# Patient Record
Sex: FEMALE | Race: WHITE | Employment: FULL TIME | URBAN - METROPOLITAN AREA
[De-identification: names, ages, dates, MRNs, and addresses within clinical notes are randomized per-mention and may not be internally consistent; named-entity substitution may affect disease eponyms.]

---

## 2017-01-01 ENCOUNTER — HOSPITAL ENCOUNTER (OUTPATIENT)
Age: 27
Discharge: HOME | End: 2017-01-01

## 2017-01-01 ENCOUNTER — APPOINTMENT (OUTPATIENT)
Dept: GENERAL RADIOLOGY | Age: 27
End: 2017-01-01
Attending: PHYSICIAN ASSISTANT

## 2017-01-01 VITALS
DIASTOLIC BLOOD PRESSURE: 66 MMHG | SYSTOLIC BLOOD PRESSURE: 109 MMHG | HEART RATE: 94 BPM | RESPIRATION RATE: 18 BRPM | WEIGHT: 125 LBS | OXYGEN SATURATION: 98 % | TEMPERATURE: 100 F

## 2017-01-01 DIAGNOSIS — J40 BRONCHITIS: ICD-10-CM

## 2017-01-01 DIAGNOSIS — R19.7 NAUSEA VOMITING AND DIARRHEA: Primary | ICD-10-CM

## 2017-01-01 DIAGNOSIS — R11.2 NAUSEA VOMITING AND DIARRHEA: Primary | ICD-10-CM

## 2017-01-01 LAB
POCT BILIRUBIN URINE: NEGATIVE
POCT BLOOD URINE: NEGATIVE
POCT GLUCOSE URINE: NEGATIVE MG/DL
POCT LEUKOCYTE ESTERASE URINE: NEGATIVE
POCT NITRITE URINE: NEGATIVE
POCT PH URINE: 7 (ref 5–8)
POCT PROTEIN URINE: 30 MG/DL
POCT SPECIFIC GRAVITY URINE: 1.02
POCT URINE CLARITY: CLEAR
POCT URINE COLOR: YELLOW
POCT URINE PREGNANCY: NEGATIVE
POCT UROBILINOGEN URINE: 4 MG/DL

## 2017-01-01 PROCEDURE — 81025 URINE PREGNANCY TEST: CPT | Performed by: PHYSICIAN ASSISTANT

## 2017-01-01 PROCEDURE — 99203 OFFICE O/P NEW LOW 30 MIN: CPT

## 2017-01-01 PROCEDURE — 81002 URINALYSIS NONAUTO W/O SCOPE: CPT | Performed by: PHYSICIAN ASSISTANT

## 2017-01-01 PROCEDURE — 71020 XR CHEST PA + LAT CHEST (CPT=71020): CPT

## 2017-01-01 PROCEDURE — 99204 OFFICE O/P NEW MOD 45 MIN: CPT

## 2017-01-01 RX ORDER — CETIRIZINE HYDROCHLORIDE, PSEUDOEPHEDRINE HYDROCHLORIDE 5; 120 MG/1; MG/1
1 TABLET, FILM COATED, EXTENDED RELEASE ORAL 2 TIMES DAILY
Qty: 14 TABLET | Refills: 0 | Status: SHIPPED | OUTPATIENT
Start: 2017-01-01 | End: 2017-09-14

## 2017-01-01 RX ORDER — ONDANSETRON 4 MG/1
4 TABLET, ORALLY DISINTEGRATING ORAL EVERY 4 HOURS PRN
Qty: 10 TABLET | Refills: 0 | Status: SHIPPED | OUTPATIENT
Start: 2017-01-01 | End: 2017-01-08

## 2017-01-01 RX ORDER — FLUTICASONE PROPIONATE 50 MCG
1 SPRAY, SUSPENSION (ML) NASAL DAILY
Qty: 16 G | Refills: 0 | Status: SHIPPED | OUTPATIENT
Start: 2017-01-01 | End: 2017-09-14

## 2017-01-01 NOTE — ED INITIAL ASSESSMENT (HPI)
Pt. Thinks she possibly ate a bad tamale yesterday about 6pm. Woke up in the night, felt like she had to use the bathroom-cramping. States she woke up about 4am with vomiting & diarrhea. Also, reports cold, sinus, cough on & off for about 1 month.  Last [employer]

## 2017-01-01 NOTE — ED PROVIDER NOTES
Patient Seen in: [facility]    History   Patient presents with:  Nausea/Vomiting/Diarrhea (gastrointestinal)  Fever: 100.6  Cough/URI    Stated Complaint: vomiting, fever    HPI    CHIEF COMPLAINT: Nausea vomiting diarrhea    HI complete review of systems was obtained and other than the HPI was negative     The patient's medication list, past medical history and social history elements is as listed in today's nurse's notes are reviewed and agree.  The patient's family history is re hypertrophy. no trismus or stridor. No phonation changes, patient handling secretions well. Uvula midline   Respiratory: there are no retractions, lungs are with crackles noted to the left lower base. The crackles do clear when the patient coughs.   No w Dictated by: Adamaris Sanders MD on 1/01/2017 at 15:43     Approved by: Adamaris Sanders MD            MDM     I discussed the radiology and laboratory results with the patient.  I discussed the diagnosis and need for followup with their primary care physician

## 2017-06-01 ENCOUNTER — PRIOR ORIGINAL RECORDS (OUTPATIENT)
Dept: OTHER | Age: 27
End: 2017-06-01

## 2017-08-24 ENCOUNTER — PRIOR ORIGINAL RECORDS (OUTPATIENT)
Dept: OTHER | Age: 27
End: 2017-08-24

## 2017-08-31 ENCOUNTER — PRIOR ORIGINAL RECORDS (OUTPATIENT)
Dept: OTHER | Age: 27
End: 2017-08-31

## 2017-09-01 ENCOUNTER — PRIOR ORIGINAL RECORDS (OUTPATIENT)
Dept: OTHER | Age: 27
End: 2017-09-01

## 2017-09-20 ENCOUNTER — OFFICE VISIT (OUTPATIENT)
Dept: OBGYN CLINIC | Facility: CLINIC | Age: 27
End: 2017-09-20

## 2017-09-20 VITALS
HEART RATE: 71 BPM | WEIGHT: 140 LBS | BODY MASS INDEX: 23.9 KG/M2 | DIASTOLIC BLOOD PRESSURE: 60 MMHG | SYSTOLIC BLOOD PRESSURE: 118 MMHG | HEIGHT: 64 IN

## 2017-09-20 DIAGNOSIS — Z30.09 BIRTH CONTROL COUNSELING: ICD-10-CM

## 2017-09-20 DIAGNOSIS — N39.498 OTHER URINARY INCONTINENCE: ICD-10-CM

## 2017-09-20 DIAGNOSIS — Z12.4 CERVICAL CANCER SCREENING: ICD-10-CM

## 2017-09-20 DIAGNOSIS — Z01.419 WELL WOMAN EXAM: Primary | ICD-10-CM

## 2017-09-20 DIAGNOSIS — Z12.39 BREAST CANCER SCREENING: ICD-10-CM

## 2017-09-20 PROCEDURE — 88175 CYTOPATH C/V AUTO FLUID REDO: CPT | Performed by: OBSTETRICS & GYNECOLOGY

## 2017-09-20 PROCEDURE — 99385 PREV VISIT NEW AGE 18-39: CPT | Performed by: OBSTETRICS & GYNECOLOGY

## 2017-09-20 RX ORDER — LEVONORGESTREL AND ETHINYL ESTRADIOL 0.1-0.02MG
1 KIT ORAL DAILY
Qty: 28 TABLET | Refills: 2 | Status: SHIPPED | OUTPATIENT
Start: 2017-09-20 | End: 2017-12-15

## 2017-09-20 NOTE — PROGRESS NOTES
GYN H&P     2017  11:08 AM    CC: Patient presents with:  Physical  Other: some urine loss for past 4 years      HPI: patient is a 33year old  here for her annual gyne exam.   Pt is new to our office.  Menses are regular, medium flow, last 5-7 Partners: Male    Birth control/ protection: Condom     Other Topics Concern    Caffeine Concern Yes    Exercise Yes    Comment: cardio and weights     Social History Narrative   None on file       ROS:     Review of Systems:   Constitutional: Negative for tender, no masses, normal size          Rectal: not indicated, no hemorrhoids  EXTREMITIES:  non tender, without edema      PLAN      1. Well woman exam  -Well woman exam   -discussed diet and exercise    2.  Cervical cancer screening    - THINPREP PAP WITH

## 2017-09-22 RX ORDER — METRONIDAZOLE 7.5 MG/G
1 GEL VAGINAL NIGHTLY
Qty: 70 G | Refills: 0 | Status: SHIPPED | OUTPATIENT
Start: 2017-09-22 | End: 2017-09-27

## 2017-09-22 NOTE — PROGRESS NOTES
Patient informed. Verbalized understanding. She stated she has been having a discharge. Metrogel sent to pharmacy.

## 2017-10-17 ENCOUNTER — TELEPHONE (OUTPATIENT)
Dept: OBGYN CLINIC | Facility: CLINIC | Age: 27
End: 2017-10-17

## 2017-10-17 NOTE — TELEPHONE ENCOUNTER
Returned patient's call. She states that she was treated for BV last month after her pap showed Bv. She did not have any symptoms at the time, but did use metrogel to treat.  Had her period the next week and now is complaining of itching, uncomfortable, bur

## 2017-10-17 NOTE — TELEPHONE ENCOUNTER
Spoke with patient again. Reported to her as noted by Nicole Rivera. She states that she doesn't believe it is BV and denies any possibility of STD. She declines treatment at this time and would prefer to be seen. OV appt made for 10/18 at 1200 with Nicole Rivera in [facility]

## 2017-10-17 NOTE — TELEPHONE ENCOUNTER
Sounds like BV or trich- if any possibility for an STD she should be seen. We can treat BV again and if symptoms persist or reoccur we will need to see her.  I can RX Clindamycin Vaginal if she wants to be treated first.

## 2017-10-17 NOTE — TELEPHONE ENCOUNTER
PT thinks she might have a yeast infection due to the side effects of the medication she was prescribed. Would like a call back from the nurse to see if she can take anything to help the symptoms.

## 2017-10-18 ENCOUNTER — OFFICE VISIT (OUTPATIENT)
Dept: OBGYN CLINIC | Facility: CLINIC | Age: 27
End: 2017-10-18

## 2017-10-18 ENCOUNTER — TELEPHONE (OUTPATIENT)
Dept: OBGYN CLINIC | Facility: CLINIC | Age: 27
End: 2017-10-18

## 2017-10-18 VITALS — BODY MASS INDEX: 24 KG/M2 | SYSTOLIC BLOOD PRESSURE: 110 MMHG | WEIGHT: 142 LBS | DIASTOLIC BLOOD PRESSURE: 68 MMHG

## 2017-10-18 DIAGNOSIS — N76.0 VAGINITIS AND VULVOVAGINITIS: ICD-10-CM

## 2017-10-18 DIAGNOSIS — N89.8 VAGINAL LEUKORRHEA: Primary | ICD-10-CM

## 2017-10-18 PROCEDURE — 99213 OFFICE O/P EST LOW 20 MIN: CPT | Performed by: NURSE PRACTITIONER

## 2017-10-18 PROCEDURE — 87660 TRICHOMONAS VAGIN DIR PROBE: CPT | Performed by: NURSE PRACTITIONER

## 2017-10-18 PROCEDURE — 87480 CANDIDA DNA DIR PROBE: CPT | Performed by: NURSE PRACTITIONER

## 2017-10-18 PROCEDURE — 87510 GARDNER VAG DNA DIR PROBE: CPT | Performed by: NURSE PRACTITIONER

## 2017-10-18 NOTE — PROGRESS NOTES
S:  Patient here with a 1 week history of vulvar itching. She notes an increase in discharge but denies odor. Symptoms are on bilateral labia near vaginal opening. Was treated with Metrogel for BV last month.     O:  Vulva without lesions or erythema  Vagin

## 2017-10-18 NOTE — TELEPHONE ENCOUNTER
Patient saw Darron Marsh today in [place] office. She forgot to ask her that she needs a excuse note from her. She didn't go to work today because of her doctor's appt. Pt wants it e- mail to her on my chart. E mail -[email or web address]

## 2017-10-19 NOTE — TELEPHONE ENCOUNTER
OK to send note stating patient was seen in the office. She was not excused from work, only that she was seen.

## 2017-10-19 NOTE — TELEPHONE ENCOUNTER
Pt called again this morning regarding a note for her work  Pt would like to have the not emailed to her asap to the email address provided below

## 2017-12-01 ENCOUNTER — PRIOR ORIGINAL RECORDS (OUTPATIENT)
Dept: OTHER | Age: 27
End: 2017-12-01

## 2017-12-06 ENCOUNTER — PRIOR ORIGINAL RECORDS (OUTPATIENT)
Dept: OTHER | Age: 27
End: 2017-12-06

## 2017-12-08 ENCOUNTER — PRIOR ORIGINAL RECORDS (OUTPATIENT)
Dept: OTHER | Age: 27
End: 2017-12-08

## 2017-12-13 ENCOUNTER — PRIOR ORIGINAL RECORDS (OUTPATIENT)
Dept: OTHER | Age: 27
End: 2017-12-13

## 2017-12-15 RX ORDER — LEVONORGESTREL AND ETHINYL ESTRADIOL 0.1-0.02MG
1 KIT ORAL DAILY
Qty: 84 TABLET | Refills: 2 | Status: SHIPPED | OUTPATIENT
Start: 2017-12-15 | End: 2018-08-27

## 2017-12-21 ENCOUNTER — PRIOR ORIGINAL RECORDS (OUTPATIENT)
Dept: OTHER | Age: 27
End: 2017-12-21

## 2017-12-28 ENCOUNTER — PRIOR ORIGINAL RECORDS (OUTPATIENT)
Dept: OTHER | Age: 27
End: 2017-12-28

## 2018-01-10 ENCOUNTER — PRIOR ORIGINAL RECORDS (OUTPATIENT)
Dept: OTHER | Age: 28
End: 2018-01-10

## 2018-02-20 ENCOUNTER — OFFICE VISIT (OUTPATIENT)
Dept: FAMILY MEDICINE CLINIC | Facility: CLINIC | Age: 28
End: 2018-02-20

## 2018-02-20 VITALS
RESPIRATION RATE: 18 BRPM | BODY MASS INDEX: 26 KG/M2 | WEIGHT: 152 LBS | OXYGEN SATURATION: 98 % | TEMPERATURE: 99 F | DIASTOLIC BLOOD PRESSURE: 70 MMHG | SYSTOLIC BLOOD PRESSURE: 120 MMHG | HEART RATE: 65 BPM

## 2018-02-20 DIAGNOSIS — J02.9 SORE THROAT: Primary | ICD-10-CM

## 2018-02-20 DIAGNOSIS — J06.9 VIRAL URI: ICD-10-CM

## 2018-02-20 LAB — CONTROL LINE PRESENT WITH A CLEAR BACKGROUND (YES/NO): YES YES/NO

## 2018-02-20 PROCEDURE — 87081 CULTURE SCREEN ONLY: CPT | Performed by: PHYSICIAN ASSISTANT

## 2018-02-20 PROCEDURE — 87880 STREP A ASSAY W/OPTIC: CPT | Performed by: PHYSICIAN ASSISTANT

## 2018-02-20 PROCEDURE — 99203 OFFICE O/P NEW LOW 30 MIN: CPT | Performed by: PHYSICIAN ASSISTANT

## 2018-02-20 NOTE — PROGRESS NOTES
CHIEF COMPLAINT:   Patient presents with:  Sore Throat: pt c\o of sore throat,     HPI:   Marshall Wilkerson is a 33year old female who presents for upper and lower respiratory symptoms for  2 days.  Patient reports sore throat, congestion, dry cough, cough is THROAT: Oral mucosa pink, moist. Posterior pharynx is erythematous. No exudates. LUNGS: clear to auscultation bilaterally, no wheezes or rhonchi. Breathing is non labored. CARDIO: RRR without murmur. LYMPH: No lymphadenopathy.       ASSESSMENT AND PLAN: · Your appetite may be poor, so a light diet is fine. Avoid dehydration by drinking 6 to 8 glasses of fluids per day (water, soft drinks, juices, tea, or soup). Extra fluids will help loosen secretions in the nose and lungs.   · Over-the-counter cold medici · Suck on throat lozenges, cough drops, hard candy, ice chips, or frozen fruit-juice bars. Use the sugar-free versions if your diet or medical condition requires them. Gargle to ease irritation  Gargling every hour or 2 can ease irritation.  Try gargling w Pharyngitis (Sore Throat), Report Pending    Pharyngitis (sore throat) is often due to a virus. It can also be caused by the streptococcus, or strep, bacterium, often called strep throat.  Both viral and strep infections can cause throat pain that is worse · For children: Use acetaminophen for fever, fussiness, or discomfort.  In infants older than 10months of age, you may use ibuprofen instead of acetaminophen. Talk with your child's healthcare provider before giving these medicines if your child has chronic · Signs of dehydration (very dark urine or no urine, sunken eyes, dizziness)  · Trouble breathing or noisy breathing  · Muffled voice  · New rash  · Child appears to be getting sicker  Date Last Reviewed: 4/13/2015  © 3631-9269 [employer].  8

## 2018-02-20 NOTE — PATIENT INSTRUCTIONS
Viral Upper Respiratory Illness (Adult)  You have a viral upper respiratory illness (URI), which is another term for the common cold. This illness is contagious during the first few days. It is spread through the air by coughing and sneezing.  It may also Call your healthcare provider right away if any of these occur:  · Cough with lots of colored sputum (mucus)  · Severe headache; face, neck, or ear pain  · Difficulty swallowing due to throat pain  · Fever of 100.4°F (38°C) or higher, or as directed by you · Ease pain with anesthetic sprays. Aspirin or an aspirin substitute also helps.  Remember, never give aspirin to anyone 25 or younger, or if you are already taking blood thinners.   · For sore throats caused by allergies, try antihistamines to block the al A test has been done to find out whether you (or your child, if your child is the patient) have strep throat. Call this facility or your healthcare provider if you were not given your test results.  If the test is positive for strep infection, you will need · Use throat lozenges or numbing throat sprays to help reduce pain. Gargling with warm salt water will also help reduce throat pain. For this, dissolve 1/2 teaspoon of salt in 1 glass of warm water.  To help soothe a sore throat, children can sip on juice o

## 2018-03-01 ENCOUNTER — PRIOR ORIGINAL RECORDS (OUTPATIENT)
Dept: OTHER | Age: 28
End: 2018-03-01

## 2018-03-08 ENCOUNTER — MYAURORA ACCOUNT LINK (OUTPATIENT)
Dept: OTHER | Age: 28
End: 2018-03-08

## 2018-03-08 ENCOUNTER — PRIOR ORIGINAL RECORDS (OUTPATIENT)
Dept: OTHER | Age: 28
End: 2018-03-08

## 2018-08-27 RX ORDER — LEVONORGESTREL AND ETHINYL ESTRADIOL 0.1-0.02MG
1 KIT ORAL DAILY
Qty: 84 TABLET | Refills: 0 | Status: SHIPPED | OUTPATIENT
Start: 2018-08-27 | End: 2018-10-19

## 2018-10-19 ENCOUNTER — TELEPHONE (OUTPATIENT)
Dept: OBGYN CLINIC | Facility: CLINIC | Age: 28
End: 2018-10-19

## 2018-10-19 RX ORDER — LEVONORGESTREL AND ETHINYL ESTRADIOL 0.1-0.02MG
1 KIT ORAL DAILY
Qty: 84 TABLET | Refills: 0 | Status: SHIPPED | OUTPATIENT
Start: 2018-10-19 | End: 2018-11-08

## 2018-10-19 NOTE — TELEPHONE ENCOUNTER
32 y/o called stating that pharmacy gave generic of [place] and she has been spotting this pack. Denies any other s/s.    Last OV date: 10/18/17, last annual 09/20/2017  Recent Test/Labs: N/A   Recommendations: patient advised that I can send brand name only

## 2018-10-19 NOTE — TELEPHONE ENCOUNTER
Patient has questions regarding the birthcontrol. The pharmacy gave her an alternative birth control that she is having side effects. She wants change back.  Please call her

## 2018-11-08 ENCOUNTER — OFFICE VISIT (OUTPATIENT)
Dept: OBGYN CLINIC | Facility: CLINIC | Age: 28
End: 2018-11-08
Payer: COMMERCIAL

## 2018-11-08 VITALS
DIASTOLIC BLOOD PRESSURE: 80 MMHG | HEIGHT: 65 IN | SYSTOLIC BLOOD PRESSURE: 122 MMHG | WEIGHT: 147.81 LBS | BODY MASS INDEX: 24.63 KG/M2 | HEART RATE: 54 BPM

## 2018-11-08 DIAGNOSIS — Z12.4 CERVICAL CANCER SCREENING: ICD-10-CM

## 2018-11-08 DIAGNOSIS — Z01.419 WELL WOMAN EXAM: Primary | ICD-10-CM

## 2018-11-08 DIAGNOSIS — Z23 NEED FOR VACCINATION: ICD-10-CM

## 2018-11-08 DIAGNOSIS — Z12.39 BREAST CANCER SCREENING: ICD-10-CM

## 2018-11-08 PROCEDURE — 90686 IIV4 VACC NO PRSV 0.5 ML IM: CPT | Performed by: OBSTETRICS & GYNECOLOGY

## 2018-11-08 PROCEDURE — 90471 IMMUNIZATION ADMIN: CPT | Performed by: OBSTETRICS & GYNECOLOGY

## 2018-11-08 PROCEDURE — 99395 PREV VISIT EST AGE 18-39: CPT | Performed by: OBSTETRICS & GYNECOLOGY

## 2018-11-08 RX ORDER — LEVONORGESTREL AND ETHINYL ESTRADIOL 0.1-0.02MG
1 KIT ORAL DAILY
Qty: 84 TABLET | Refills: 4 | Status: SHIPPED | OUTPATIENT
Start: 2018-11-08 | End: 2019-07-24

## 2018-11-08 NOTE — PROGRESS NOTES
GYN H&P     2018  3:22 PM    CC: Patient presents with:  Physical      HPI: patient is a 33year old  here for her annual gyne exam.   Doing well  Taking ocp, pharmacy switched her ocp to generic, now switched back.  Refills given  Pt is mar No        Comment: 1-2 monthly      Drug use: No      Sexual activity: Yes        Partners: Male        Birth control/protection: Pill    Other Topics      Concerns:         Service: Not Asked        Blood Transfusions: Not Asked        Caffeine Co adenopathy  ABDOMEN: Soft, non distended; non tender, no masses  GYNE/:                        External Genitalia: Normal appearing, no lesions.            Bladder: well supported, urethra wnl, no lesions                     Vagina: normal pink mucosa, no

## 2019-02-28 VITALS
HEIGHT: 65 IN | HEART RATE: 76 BPM | BODY MASS INDEX: 22.49 KG/M2 | RESPIRATION RATE: 16 BRPM | DIASTOLIC BLOOD PRESSURE: 60 MMHG | SYSTOLIC BLOOD PRESSURE: 110 MMHG | WEIGHT: 135 LBS

## 2019-02-28 VITALS
WEIGHT: 135 LBS | RESPIRATION RATE: 16 BRPM | SYSTOLIC BLOOD PRESSURE: 108 MMHG | BODY MASS INDEX: 22.49 KG/M2 | DIASTOLIC BLOOD PRESSURE: 68 MMHG | HEART RATE: 76 BPM | HEIGHT: 65 IN

## 2019-03-01 VITALS
HEIGHT: 65 IN | SYSTOLIC BLOOD PRESSURE: 108 MMHG | BODY MASS INDEX: 21.66 KG/M2 | HEART RATE: 72 BPM | RESPIRATION RATE: 16 BRPM | WEIGHT: 130 LBS | DIASTOLIC BLOOD PRESSURE: 70 MMHG

## 2019-06-27 ENCOUNTER — TELEPHONE (OUTPATIENT)
Dept: OBGYN CLINIC | Facility: CLINIC | Age: 29
End: 2019-06-27

## 2019-06-27 NOTE — TELEPHONE ENCOUNTER
Patient calling to initiate prenatal care  LMP  5/29    Patient is 7-8 weeks 7/24  Confirmation Ultrasound and Appointment scheduled on 7/24  Insurance [employer] POS  Good time to return phone call anytime

## 2019-06-27 NOTE — TELEPHONE ENCOUNTER
Meds: None  PMH: None  PSH: None  Denies any complications in prior pregnancy. Denies any questions/concerns. Advised to keep appt as scheduled.

## 2019-07-24 ENCOUNTER — ULTRASOUND ENCOUNTER (OUTPATIENT)
Dept: OBGYN CLINIC | Facility: CLINIC | Age: 29
End: 2019-07-24
Payer: COMMERCIAL

## 2019-07-24 ENCOUNTER — OFFICE VISIT (OUTPATIENT)
Dept: OBGYN CLINIC | Facility: CLINIC | Age: 29
End: 2019-07-24
Payer: COMMERCIAL

## 2019-07-24 VITALS
SYSTOLIC BLOOD PRESSURE: 108 MMHG | BODY MASS INDEX: 25 KG/M2 | HEART RATE: 62 BPM | WEIGHT: 152 LBS | DIASTOLIC BLOOD PRESSURE: 64 MMHG

## 2019-07-24 DIAGNOSIS — N91.1 SECONDARY AMENORRHEA: Primary | ICD-10-CM

## 2019-07-24 DIAGNOSIS — Z32.01 PREGNANCY EXAMINATION OR TEST, POSITIVE RESULT: ICD-10-CM

## 2019-07-24 PROCEDURE — 76856 US EXAM PELVIC COMPLETE: CPT | Performed by: OBSTETRICS & GYNECOLOGY

## 2019-07-24 PROCEDURE — 99213 OFFICE O/P EST LOW 20 MIN: CPT | Performed by: OBSTETRICS & GYNECOLOGY

## 2019-07-24 RX ORDER — PRENATAL VIT/IRON FUM/FOLIC AC 27MG-0.8MG
1 TABLET ORAL DAILY
COMMUNITY
End: 2020-10-26

## 2019-07-24 NOTE — PROGRESS NOTES
[facility]  Obstetrics and Gynecology    Subjective:     Ramu Rucker is a 33year old  female presents with c/o secondary amenorrhea and positive pregnancy test. The patient was recommended to return for further evaluation.  The patien notes understanding and agrees with the plan of care. All questions were answered to the best of my ability at this time.     RTC in 3 weeks or sooner if needed

## 2019-07-24 NOTE — PATIENT INSTRUCTIONS
Medications Safe in Pregnancy  The following over-the-counter medications may be taken safely after 12 weeks gestation by any patient who is pregnant. Please follow the instructions on the package for adult dosage.   If you experience any symptoms roger

## 2019-08-20 ENCOUNTER — INITIAL PRENATAL (OUTPATIENT)
Dept: OBGYN CLINIC | Facility: CLINIC | Age: 29
End: 2019-08-20
Payer: COMMERCIAL

## 2019-08-20 ENCOUNTER — LAB ENCOUNTER (OUTPATIENT)
Dept: LAB | Age: 29
End: 2019-08-20
Attending: OBSTETRICS & GYNECOLOGY
Payer: COMMERCIAL

## 2019-08-20 VITALS
WEIGHT: 155 LBS | BODY MASS INDEX: 25.83 KG/M2 | DIASTOLIC BLOOD PRESSURE: 64 MMHG | SYSTOLIC BLOOD PRESSURE: 118 MMHG | HEIGHT: 65 IN

## 2019-08-20 DIAGNOSIS — Z34.80 SUPERVISION OF OTHER NORMAL PREGNANCY, ANTEPARTUM: ICD-10-CM

## 2019-08-20 DIAGNOSIS — Z36.9 PRENATAL SCREENING ENCOUNTER: ICD-10-CM

## 2019-08-20 DIAGNOSIS — Z34.80 SUPERVISION OF OTHER NORMAL PREGNANCY, ANTEPARTUM: Primary | ICD-10-CM

## 2019-08-20 LAB
ANTIBODY SCREEN: NEGATIVE
BASOPHILS # BLD AUTO: 0.03 X10(3) UL (ref 0–0.2)
BASOPHILS NFR BLD AUTO: 0.3 %
DEPRECATED RDW RBC AUTO: 45 FL (ref 35.1–46.3)
EOSINOPHIL # BLD AUTO: 0.04 X10(3) UL (ref 0–0.7)
EOSINOPHIL NFR BLD AUTO: 0.4 %
ERYTHROCYTE [DISTWIDTH] IN BLOOD BY AUTOMATED COUNT: 13.1 % (ref 11–15)
HBV SURFACE AG SER-ACNC: <0.1 [IU]/L
HBV SURFACE AG SERPL QL IA: NONREACTIVE
HCT VFR BLD AUTO: 36.2 % (ref 35–48)
HGB BLD-MCNC: 12.4 G/DL (ref 12–16)
IMM GRANULOCYTES # BLD AUTO: 0.07 X10(3) UL (ref 0–1)
IMM GRANULOCYTES NFR BLD: 0.7 %
LYMPHOCYTES # BLD AUTO: 1.64 X10(3) UL (ref 1–4)
LYMPHOCYTES NFR BLD AUTO: 17 %
MCH RBC QN AUTO: 32.3 PG (ref 26–34)
MCHC RBC AUTO-ENTMCNC: 34.3 G/DL (ref 31–37)
MCV RBC AUTO: 94.3 FL (ref 80–100)
MONOCYTES # BLD AUTO: 0.37 X10(3) UL (ref 0.1–1)
MONOCYTES NFR BLD AUTO: 3.8 %
MULTISTIX LOT#: NORMAL NUMERIC
NEUTROPHILS # BLD AUTO: 7.51 X10 (3) UL (ref 1.5–7.7)
NEUTROPHILS # BLD AUTO: 7.51 X10(3) UL (ref 1.5–7.7)
NEUTROPHILS NFR BLD AUTO: 77.8 %
PLATELET # BLD AUTO: 244 10(3)UL (ref 150–450)
RBC # BLD AUTO: 3.84 X10(6)UL (ref 3.8–5.3)
RH BLOOD TYPE: POSITIVE
RUBV IGG SER QL: POSITIVE
RUBV IGG SER-ACNC: 100.9 IU/ML (ref 10–?)
T PALLIDUM AB SER QL IA: NONREACTIVE
WBC # BLD AUTO: 9.7 X10(3) UL (ref 4–11)

## 2019-08-20 PROCEDURE — 86901 BLOOD TYPING SEROLOGIC RH(D): CPT

## 2019-08-20 PROCEDURE — 86850 RBC ANTIBODY SCREEN: CPT

## 2019-08-20 PROCEDURE — 87491 CHLMYD TRACH DNA AMP PROBE: CPT | Performed by: OBSTETRICS & GYNECOLOGY

## 2019-08-20 PROCEDURE — 86762 RUBELLA ANTIBODY: CPT

## 2019-08-20 PROCEDURE — 87086 URINE CULTURE/COLONY COUNT: CPT | Performed by: OBSTETRICS & GYNECOLOGY

## 2019-08-20 PROCEDURE — 86780 TREPONEMA PALLIDUM: CPT

## 2019-08-20 PROCEDURE — 36415 COLL VENOUS BLD VENIPUNCTURE: CPT

## 2019-08-20 PROCEDURE — 85025 COMPLETE CBC W/AUTO DIFF WBC: CPT

## 2019-08-20 PROCEDURE — 87340 HEPATITIS B SURFACE AG IA: CPT

## 2019-08-20 PROCEDURE — 81002 URINALYSIS NONAUTO W/O SCOPE: CPT | Performed by: OBSTETRICS & GYNECOLOGY

## 2019-08-20 PROCEDURE — 87389 HIV-1 AG W/HIV-1&-2 AB AG IA: CPT

## 2019-08-20 PROCEDURE — 86900 BLOOD TYPING SEROLOGIC ABO: CPT

## 2019-08-20 PROCEDURE — 87591 N.GONORRHOEAE DNA AMP PROB: CPT | Performed by: OBSTETRICS & GYNECOLOGY

## 2019-08-20 NOTE — PROGRESS NOTES
[address] [place] [address]  Obstetrics and Gynecology  History & Physical    CC: Patient is here to establish prenatal care     Subjective:     HPI:  Alva Garcia is a 30year old  female at [address] [place] [address] who presents today to establish prenatal care.  Patient Genetic Screening tests  - initially declined all testing  - she said she might consider 1st trimester screen; will check with insurance and call us.       Patient education  - Pt counseled on safety, diet, exercise, caffiene, tobacco, ETOH, sexual Temple

## 2019-08-21 LAB
C TRACH DNA SPEC QL NAA+PROBE: NEGATIVE
N GONORRHOEA DNA SPEC QL NAA+PROBE: NEGATIVE

## 2019-09-17 ENCOUNTER — ROUTINE PRENATAL (OUTPATIENT)
Dept: OBGYN CLINIC | Facility: CLINIC | Age: 29
End: 2019-09-17
Payer: COMMERCIAL

## 2019-09-17 VITALS
BODY MASS INDEX: 27.13 KG/M2 | HEIGHT: 65 IN | WEIGHT: 162.81 LBS | SYSTOLIC BLOOD PRESSURE: 108 MMHG | DIASTOLIC BLOOD PRESSURE: 70 MMHG

## 2019-09-17 DIAGNOSIS — Z34.80 SUPERVISION OF OTHER NORMAL PREGNANCY, ANTEPARTUM: ICD-10-CM

## 2019-09-17 DIAGNOSIS — Z23 NEED FOR VACCINATION: ICD-10-CM

## 2019-09-17 DIAGNOSIS — Z36.9 PRENATAL SCREENING ENCOUNTER: Primary | ICD-10-CM

## 2019-09-17 LAB — MULTISTIX LOT#: NORMAL NUMERIC

## 2019-09-17 PROCEDURE — 90471 IMMUNIZATION ADMIN: CPT | Performed by: OBSTETRICS & GYNECOLOGY

## 2019-09-17 PROCEDURE — 90686 IIV4 VACC NO PRSV 0.5 ML IM: CPT | Performed by: OBSTETRICS & GYNECOLOGY

## 2019-09-17 PROCEDURE — 81002 URINALYSIS NONAUTO W/O SCOPE: CPT | Performed by: OBSTETRICS & GYNECOLOGY

## 2019-09-17 NOTE — PROGRESS NOTES
C/O eczema, pruritic, using HC but not helping  Stress discussed, can try Benadryl  labs normal  Scan next visit  No genetic testing  4 weeks

## 2019-09-17 NOTE — PATIENT INSTRUCTIONS
09/17/19    ULTRASOUND OBSTETRIC PREPARATION INSTRUCTIONS  Please follow the below ultrasound instructions based on the gestational age you will be when you have your ultrasound exam.     Gestational age less than 12 weeks:  You do not need a full bladder f

## 2019-10-18 ENCOUNTER — ULTRASOUND ENCOUNTER (OUTPATIENT)
Dept: OBGYN CLINIC | Facility: CLINIC | Age: 29
End: 2019-10-18
Payer: COMMERCIAL

## 2019-10-18 ENCOUNTER — ROUTINE PRENATAL (OUTPATIENT)
Dept: OBGYN CLINIC | Facility: CLINIC | Age: 29
End: 2019-10-18
Payer: COMMERCIAL

## 2019-10-18 VITALS
HEIGHT: 65 IN | WEIGHT: 169 LBS | DIASTOLIC BLOOD PRESSURE: 70 MMHG | SYSTOLIC BLOOD PRESSURE: 112 MMHG | BODY MASS INDEX: 28.16 KG/M2

## 2019-10-18 DIAGNOSIS — Z36.89 ENCOUNTER FOR ROUTINE FETAL ULTRASOUND: ICD-10-CM

## 2019-10-18 DIAGNOSIS — Z36.9 PRENATAL SCREENING ENCOUNTER: ICD-10-CM

## 2019-10-18 DIAGNOSIS — O44.40 LOW-LYING PLACENTA: ICD-10-CM

## 2019-10-18 DIAGNOSIS — Z34.80 SUPERVISION OF OTHER NORMAL PREGNANCY, ANTEPARTUM: Primary | ICD-10-CM

## 2019-10-18 PROCEDURE — 81002 URINALYSIS NONAUTO W/O SCOPE: CPT | Performed by: OBSTETRICS & GYNECOLOGY

## 2019-10-18 PROCEDURE — 76805 OB US >/= 14 WKS SNGL FETUS: CPT | Performed by: OBSTETRICS & GYNECOLOGY

## 2019-10-18 NOTE — PROGRESS NOTES
HUSSEIN  Doing well  No complaints. Denies LOF/VB/uctx  RH positive  Anatomy Scan unremarkable except for low-lying placenta, about 1.3 cm from the internal loss. Discussed the findings with patient. I recommend repeat ultrasonography in 8 weeks.   CBC and 1

## 2019-11-14 ENCOUNTER — ROUTINE PRENATAL (OUTPATIENT)
Dept: OBGYN CLINIC | Facility: CLINIC | Age: 29
End: 2019-11-14
Payer: COMMERCIAL

## 2019-11-14 ENCOUNTER — LAB ENCOUNTER (OUTPATIENT)
Dept: LAB | Age: 29
End: 2019-11-14
Attending: OBSTETRICS & GYNECOLOGY
Payer: COMMERCIAL

## 2019-11-14 VITALS — BODY MASS INDEX: 29 KG/M2 | SYSTOLIC BLOOD PRESSURE: 100 MMHG | WEIGHT: 176 LBS | DIASTOLIC BLOOD PRESSURE: 68 MMHG

## 2019-11-14 DIAGNOSIS — Z34.80 SUPERVISION OF OTHER NORMAL PREGNANCY, ANTEPARTUM: ICD-10-CM

## 2019-11-14 DIAGNOSIS — O44.40 LOW-LYING PLACENTA: ICD-10-CM

## 2019-11-14 DIAGNOSIS — Z36.9 PRENATAL SCREENING ENCOUNTER: Primary | ICD-10-CM

## 2019-11-14 PROCEDURE — 82950 GLUCOSE TEST: CPT

## 2019-11-14 PROCEDURE — 85025 COMPLETE CBC W/AUTO DIFF WBC: CPT

## 2019-11-14 PROCEDURE — 81002 URINALYSIS NONAUTO W/O SCOPE: CPT | Performed by: NURSE PRACTITIONER

## 2019-11-14 PROCEDURE — 36415 COLL VENOUS BLD VENIPUNCTURE: CPT

## 2019-11-14 NOTE — PROGRESS NOTES
HUSSEIN  Doing well but with cold symptoms. Medical list given to patient, she is to call with fever or worsening symptoms.   FM is good  RH positive  1 hr glucose/ CBC in progress  TDAP recommended   RTC 4wks with US to eval LL Placenta

## 2019-12-12 ENCOUNTER — ULTRASOUND ENCOUNTER (OUTPATIENT)
Dept: OBGYN CLINIC | Facility: CLINIC | Age: 29
End: 2019-12-12
Payer: COMMERCIAL

## 2019-12-12 ENCOUNTER — ROUTINE PRENATAL (OUTPATIENT)
Dept: OBGYN CLINIC | Facility: CLINIC | Age: 29
End: 2019-12-12
Payer: COMMERCIAL

## 2019-12-12 VITALS — DIASTOLIC BLOOD PRESSURE: 66 MMHG | BODY MASS INDEX: 31 KG/M2 | SYSTOLIC BLOOD PRESSURE: 122 MMHG | WEIGHT: 184.81 LBS

## 2019-12-12 DIAGNOSIS — Z34.93 ENCOUNTER FOR SUPERVISION OF NORMAL PREGNANCY IN THIRD TRIMESTER, UNSPECIFIED GRAVIDITY: Primary | ICD-10-CM

## 2019-12-12 DIAGNOSIS — Z23 NEED FOR VACCINATION: ICD-10-CM

## 2019-12-12 DIAGNOSIS — O44.40 LOW-LYING PLACENTA: ICD-10-CM

## 2019-12-12 PROCEDURE — 81002 URINALYSIS NONAUTO W/O SCOPE: CPT | Performed by: OBSTETRICS & GYNECOLOGY

## 2019-12-12 PROCEDURE — 76816 OB US FOLLOW-UP PER FETUS: CPT | Performed by: OBSTETRICS & GYNECOLOGY

## 2019-12-12 PROCEDURE — 90715 TDAP VACCINE 7 YRS/> IM: CPT | Performed by: OBSTETRICS & GYNECOLOGY

## 2019-12-12 PROCEDURE — 90471 IMMUNIZATION ADMIN: CPT | Performed by: OBSTETRICS & GYNECOLOGY

## 2019-12-12 NOTE — PATIENT INSTRUCTIONS
Tdap Vaccine: What You Need To Know    1. Why Get Vaccinated:    · Tetanus, diphtheria, and pertussis can be very serious diseases, even for adolescents and adults. Tdap vaccine can protect us from these diseases.     · Tetanus (Lockjaw) causes painful mu tetanus infection. Medications Safe in Pregnancy  The following over-the-counter medications may be taken safely after 12 weeks gestation by any patient who is pregnant. Please follow the instructions on the package for adult dosage.   If you experi

## 2019-12-12 NOTE — PROGRESS NOTES
HUSSEIN   Doing well, +FM  Denies LOF/VB/uctx  Rh positive, TDAP received, EPDS 2  Fetal movement count given  Repeat HIV ordered and instructions provided   Low lying placenta, repeat OB US noted for placenta 2.6 cm from internal OS. Repeat at 32 weeks.

## 2019-12-26 ENCOUNTER — LAB ENCOUNTER (OUTPATIENT)
Dept: LAB | Age: 29
End: 2019-12-26
Attending: OBSTETRICS & GYNECOLOGY
Payer: COMMERCIAL

## 2019-12-26 ENCOUNTER — ROUTINE PRENATAL (OUTPATIENT)
Dept: OBGYN CLINIC | Facility: CLINIC | Age: 29
End: 2019-12-26
Payer: COMMERCIAL

## 2019-12-26 VITALS
DIASTOLIC BLOOD PRESSURE: 70 MMHG | WEIGHT: 189.38 LBS | BODY MASS INDEX: 31.55 KG/M2 | SYSTOLIC BLOOD PRESSURE: 120 MMHG | HEIGHT: 65 IN

## 2019-12-26 DIAGNOSIS — Z34.83 ENCOUNTER FOR SUPERVISION OF OTHER NORMAL PREGNANCY IN THIRD TRIMESTER: ICD-10-CM

## 2019-12-26 DIAGNOSIS — O44.40 LOW-LYING PLACENTA: ICD-10-CM

## 2019-12-26 DIAGNOSIS — Z34.93 ENCOUNTER FOR SUPERVISION OF NORMAL PREGNANCY IN THIRD TRIMESTER, UNSPECIFIED GRAVIDITY: ICD-10-CM

## 2019-12-26 DIAGNOSIS — Z36.9 PRENATAL SCREENING ENCOUNTER: Primary | ICD-10-CM

## 2019-12-26 DIAGNOSIS — Z34.90 ENCOUNTER FOR SUPERVISION OF NORMAL PREGNANCY, ANTEPARTUM, UNSPECIFIED GRAVIDITY: ICD-10-CM

## 2019-12-26 PROCEDURE — 81002 URINALYSIS NONAUTO W/O SCOPE: CPT | Performed by: OBSTETRICS & GYNECOLOGY

## 2019-12-26 PROCEDURE — 36415 COLL VENOUS BLD VENIPUNCTURE: CPT

## 2019-12-26 PROCEDURE — 87389 HIV-1 AG W/HIV-1&-2 AB AG IA: CPT

## 2019-12-26 NOTE — PROGRESS NOTES
HUSSEIN  Doing well, +FM  Had an episode of menstrual like contractions this weekend that spontaneously resolved and none since.  Has not been more active  no LOF, VB    Patient Active Problem List:     Encounter for supervision of normal pregnancy in third tri

## 2020-01-09 ENCOUNTER — ROUTINE PRENATAL (OUTPATIENT)
Dept: OBGYN CLINIC | Facility: CLINIC | Age: 30
End: 2020-01-09
Payer: COMMERCIAL

## 2020-01-09 ENCOUNTER — MED REC SCAN ONLY (OUTPATIENT)
Dept: OBGYN CLINIC | Facility: CLINIC | Age: 30
End: 2020-01-09

## 2020-01-09 ENCOUNTER — TELEPHONE (OUTPATIENT)
Dept: OBGYN CLINIC | Facility: CLINIC | Age: 30
End: 2020-01-09

## 2020-01-09 ENCOUNTER — ULTRASOUND ENCOUNTER (OUTPATIENT)
Dept: OBGYN CLINIC | Facility: CLINIC | Age: 30
End: 2020-01-09
Payer: COMMERCIAL

## 2020-01-09 VITALS — BODY MASS INDEX: 32 KG/M2 | SYSTOLIC BLOOD PRESSURE: 114 MMHG | WEIGHT: 192 LBS | DIASTOLIC BLOOD PRESSURE: 62 MMHG

## 2020-01-09 DIAGNOSIS — Z36.89 ENCOUNTER FOR FETAL ANATOMIC SURVEY: ICD-10-CM

## 2020-01-09 DIAGNOSIS — O44.40 LOW-LYING PLACENTA: ICD-10-CM

## 2020-01-09 DIAGNOSIS — Z36.9 PRENATAL SCREENING ENCOUNTER: ICD-10-CM

## 2020-01-09 DIAGNOSIS — Z3A.32 32 WEEKS GESTATION OF PREGNANCY: Primary | ICD-10-CM

## 2020-01-09 LAB — MULTISTIX LOT#: NORMAL NUMERIC

## 2020-01-09 PROCEDURE — 76816 OB US FOLLOW-UP PER FETUS: CPT | Performed by: OBSTETRICS & GYNECOLOGY

## 2020-01-09 PROCEDURE — 81002 URINALYSIS NONAUTO W/O SCOPE: CPT | Performed by: OBSTETRICS & GYNECOLOGY

## 2020-01-09 NOTE — PROGRESS NOTES
HUSSEIN  Doing well, +FM  Denies VB/LOF/uctx  Rh +, TDAP received, EPDS  Repeat u/s today, placenta no longer low lying and good growth  RTC in 2 wks  Fetal movement instructions given

## 2020-01-09 NOTE — PATIENT INSTRUCTIONS
FETAL MOVEMENT CHART    Begin counting fetal movements at 32 weeks of pregnancy. You may find that your baby is more active after eating or drinking. We want you to time how long it takes to feel 10 movements (kicks, flutters, swishes or rolls).   Soraida Villatoro

## 2020-01-09 NOTE — TELEPHONE ENCOUNTER
Form completed and signed by Dr. Denny Maldonado to file in plfd  Copy to scan  Copy given to patient

## 2020-01-09 NOTE — PROGRESS NOTES
OB ANATOMIC SURVEY    IUP at 32w 1d  Measuring 31w 1d  Efw 1677g, AGA, 29%  Fhr 150  Pos: vertex  Anatomic survey nl growth  3 vessel cord, post no longer low lying plac  Nl afv, sikp 15.22 cm

## 2020-01-22 ENCOUNTER — ROUTINE PRENATAL (OUTPATIENT)
Dept: OBGYN CLINIC | Facility: CLINIC | Age: 30
End: 2020-01-22
Payer: COMMERCIAL

## 2020-01-22 VITALS
WEIGHT: 195 LBS | DIASTOLIC BLOOD PRESSURE: 64 MMHG | SYSTOLIC BLOOD PRESSURE: 100 MMHG | HEIGHT: 65 IN | BODY MASS INDEX: 32.49 KG/M2

## 2020-01-22 DIAGNOSIS — Z36.9 PRENATAL SCREENING ENCOUNTER: Primary | ICD-10-CM

## 2020-01-22 DIAGNOSIS — Z34.83 ENCOUNTER FOR SUPERVISION OF OTHER NORMAL PREGNANCY IN THIRD TRIMESTER: ICD-10-CM

## 2020-01-22 LAB — MULTISTIX LOT#: NORMAL NUMERIC

## 2020-01-22 PROCEDURE — 81002 URINALYSIS NONAUTO W/O SCOPE: CPT | Performed by: NURSE PRACTITIONER

## 2020-02-05 ENCOUNTER — ROUTINE PRENATAL (OUTPATIENT)
Dept: OBGYN CLINIC | Facility: CLINIC | Age: 30
End: 2020-02-05
Payer: COMMERCIAL

## 2020-02-05 VITALS — SYSTOLIC BLOOD PRESSURE: 112 MMHG | BODY MASS INDEX: 33 KG/M2 | WEIGHT: 201 LBS | DIASTOLIC BLOOD PRESSURE: 70 MMHG

## 2020-02-05 DIAGNOSIS — Z34.83 ENCOUNTER FOR SUPERVISION OF OTHER NORMAL PREGNANCY IN THIRD TRIMESTER: ICD-10-CM

## 2020-02-05 DIAGNOSIS — Z36.9 PRENATAL SCREENING ENCOUNTER: Primary | ICD-10-CM

## 2020-02-05 LAB — MULTISTIX LOT#: NORMAL NUMERIC

## 2020-02-05 PROCEDURE — 87081 CULTURE SCREEN ONLY: CPT | Performed by: NURSE PRACTITIONER

## 2020-02-05 PROCEDURE — 87653 STREP B DNA AMP PROBE: CPT | Performed by: NURSE PRACTITIONER

## 2020-02-05 PROCEDURE — 81002 URINALYSIS NONAUTO W/O SCOPE: CPT | Performed by: NURSE PRACTITIONER

## 2020-02-05 NOTE — PROGRESS NOTES
HUSSEIN  Doing well, +FM  Denies VB/LOF/uctx  Mode of delivery:  anticipated  Labor precautions discussed  GBS collected   RTC 1 week

## 2020-02-12 ENCOUNTER — ROUTINE PRENATAL (OUTPATIENT)
Dept: OBGYN CLINIC | Facility: CLINIC | Age: 30
End: 2020-02-12
Payer: COMMERCIAL

## 2020-02-12 VITALS
SYSTOLIC BLOOD PRESSURE: 118 MMHG | DIASTOLIC BLOOD PRESSURE: 80 MMHG | BODY MASS INDEX: 33.79 KG/M2 | HEIGHT: 65 IN | WEIGHT: 202.81 LBS

## 2020-02-12 DIAGNOSIS — Z34.93 ENCOUNTER FOR SUPERVISION OF NORMAL PREGNANCY IN THIRD TRIMESTER, UNSPECIFIED GRAVIDITY: Primary | ICD-10-CM

## 2020-02-12 DIAGNOSIS — Z36.9 PRENATAL SCREENING ENCOUNTER: ICD-10-CM

## 2020-02-12 DIAGNOSIS — O99.820 GBS (GROUP B STREPTOCOCCUS CARRIER), +RV CULTURE, CURRENTLY PREGNANT: ICD-10-CM

## 2020-02-12 PROBLEM — O44.40 LOW-LYING PLACENTA: Status: RESOLVED | Noted: 2019-10-18 | Resolved: 2020-02-12

## 2020-02-12 PROBLEM — O44.40 LOW-LYING PLACENTA (HCC): Status: RESOLVED | Noted: 2019-10-18 | Resolved: 2020-02-12

## 2020-02-12 LAB — MULTISTIX LOT#: NORMAL NUMERIC

## 2020-02-12 PROCEDURE — 81002 URINALYSIS NONAUTO W/O SCOPE: CPT | Performed by: OBSTETRICS & GYNECOLOGY

## 2020-02-12 NOTE — PROGRESS NOTES
HUSSEIN  Doing well, +FM  Reports 2 days ago her fetal movement was slightly decreased but did have 10 movements in 2 hours. However, her fetal movement has returned to her expected counts today.    Denies LOF/VB/uctx  Mode of delivery:  anticipated  SVE 0/5

## 2020-02-19 ENCOUNTER — ROUTINE PRENATAL (OUTPATIENT)
Dept: OBGYN CLINIC | Facility: CLINIC | Age: 30
End: 2020-02-19
Payer: COMMERCIAL

## 2020-02-19 VITALS — WEIGHT: 206.81 LBS | DIASTOLIC BLOOD PRESSURE: 68 MMHG | SYSTOLIC BLOOD PRESSURE: 116 MMHG | BODY MASS INDEX: 34 KG/M2

## 2020-02-19 DIAGNOSIS — Z36.9 PRENATAL SCREENING ENCOUNTER: ICD-10-CM

## 2020-02-19 DIAGNOSIS — Z3A.38 38 WEEKS GESTATION OF PREGNANCY: Primary | ICD-10-CM

## 2020-02-19 DIAGNOSIS — Z34.83 ENCOUNTER FOR SUPERVISION OF OTHER NORMAL PREGNANCY IN THIRD TRIMESTER: ICD-10-CM

## 2020-02-19 LAB
GLUCOSE (URINE DIPSTICK): NEGATIVE MG/DL
MULTISTIX LOT#: NORMAL NUMERIC
PROTEIN (URINE DIPSTICK): NEGATIVE MG/DL

## 2020-02-19 PROCEDURE — 81002 URINALYSIS NONAUTO W/O SCOPE: CPT | Performed by: OBSTETRICS & GYNECOLOGY

## 2020-02-19 NOTE — PROGRESS NOTES
HUSSEIN  Doing well, +FM  Denies VB/LOF/uctx  Mode of delivery:   anticipated  SVE def   GBS collected (35-37)POS  RTC 1 week

## 2020-02-25 ENCOUNTER — HOSPITAL ENCOUNTER (INPATIENT)
Facility: HOSPITAL | Age: 30
LOS: 1 days | Discharge: HOME | End: 2020-02-26
Attending: OBSTETRICS & GYNECOLOGY | Admitting: OBSTETRICS & GYNECOLOGY
Payer: COMMERCIAL

## 2020-02-25 PROBLEM — Z34.90 PREGNANCY (HCC): Status: ACTIVE | Noted: 2020-02-25

## 2020-02-25 PROBLEM — Z34.90 PREGNANCY: Status: ACTIVE | Noted: 2020-02-25

## 2020-02-25 LAB
ANTIBODY SCREEN: NEGATIVE
DEPRECATED RDW RBC AUTO: 42 FL (ref 35.1–46.3)
ERYTHROCYTE [DISTWIDTH] IN BLOOD BY AUTOMATED COUNT: 12.2 % (ref 11–15)
HCT VFR BLD AUTO: 35.3 % (ref 35–48)
HGB BLD-MCNC: 12 G/DL (ref 12–16)
MCH RBC QN AUTO: 32.1 PG (ref 26–34)
MCHC RBC AUTO-ENTMCNC: 34 G/DL (ref 31–37)
MCV RBC AUTO: 94.4 FL (ref 80–100)
PLATELET # BLD AUTO: 195 10(3)UL (ref 150–450)
RBC # BLD AUTO: 3.74 X10(6)UL (ref 3.8–5.3)
RH BLOOD TYPE: POSITIVE
WBC # BLD AUTO: 9.7 X10(3) UL (ref 4–11)

## 2020-02-25 PROCEDURE — 59400 OBSTETRICAL CARE: CPT | Performed by: OBSTETRICS & GYNECOLOGY

## 2020-02-25 RX ORDER — TERBUTALINE SULFATE 1 MG/ML
0.25 INJECTION, SOLUTION SUBCUTANEOUS AS NEEDED
Status: DISCONTINUED | OUTPATIENT
Start: 2020-02-25 | End: 2020-02-25

## 2020-02-25 RX ORDER — AMMONIA INHALANTS 0.04 G/.3ML
0.3 INHALANT RESPIRATORY (INHALATION) AS NEEDED
Status: DISCONTINUED | OUTPATIENT
Start: 2020-02-25 | End: 2020-02-25

## 2020-02-25 RX ORDER — OXYTOCIN 10 [USP'U]/ML
10 INJECTION, SOLUTION INTRAMUSCULAR; INTRAVENOUS ONCE
Status: COMPLETED | OUTPATIENT
Start: 2020-02-25 | End: 2020-02-25

## 2020-02-25 RX ORDER — TRISODIUM CITRATE DIHYDRATE AND CITRIC ACID MONOHYDRATE 500; 334 MG/5ML; MG/5ML
30 SOLUTION ORAL AS NEEDED
Status: DISCONTINUED | OUTPATIENT
Start: 2020-02-25 | End: 2020-02-25

## 2020-02-25 RX ORDER — ACETAMINOPHEN 325 MG/1
650 TABLET ORAL EVERY 6 HOURS PRN
Status: DISCONTINUED | OUTPATIENT
Start: 2020-02-25 | End: 2020-02-26

## 2020-02-25 RX ORDER — SIMETHICONE 80 MG
80 TABLET,CHEWABLE ORAL 3 TIMES DAILY PRN
Status: DISCONTINUED | OUTPATIENT
Start: 2020-02-25 | End: 2020-02-26

## 2020-02-25 RX ORDER — DEXTROSE, SODIUM CHLORIDE, SODIUM LACTATE, POTASSIUM CHLORIDE, AND CALCIUM CHLORIDE 5; .6; .31; .03; .02 G/100ML; G/100ML; G/100ML; G/100ML; G/100ML
INJECTION, SOLUTION INTRAVENOUS AS NEEDED
Status: DISCONTINUED | OUTPATIENT
Start: 2020-02-25 | End: 2020-02-25

## 2020-02-25 RX ORDER — BISACODYL 10 MG
10 SUPPOSITORY, RECTAL RECTAL ONCE AS NEEDED
Status: ACTIVE | OUTPATIENT
Start: 2020-02-25 | End: 2020-02-25

## 2020-02-25 RX ORDER — IBUPROFEN 600 MG/1
600 TABLET ORAL ONCE AS NEEDED
Status: DISCONTINUED | OUTPATIENT
Start: 2020-02-25 | End: 2020-02-25

## 2020-02-25 RX ORDER — SODIUM CHLORIDE, SODIUM LACTATE, POTASSIUM CHLORIDE, CALCIUM CHLORIDE 600; 310; 30; 20 MG/100ML; MG/100ML; MG/100ML; MG/100ML
INJECTION, SOLUTION INTRAVENOUS CONTINUOUS
Status: DISCONTINUED | OUTPATIENT
Start: 2020-02-25 | End: 2020-02-25

## 2020-02-25 RX ORDER — IBUPROFEN 600 MG/1
600 TABLET ORAL EVERY 6 HOURS
Status: DISCONTINUED | OUTPATIENT
Start: 2020-02-25 | End: 2020-02-26

## 2020-02-25 RX ORDER — ZOLPIDEM TARTRATE 5 MG/1
5 TABLET ORAL NIGHTLY PRN
Status: DISCONTINUED | OUTPATIENT
Start: 2020-02-25 | End: 2020-02-26

## 2020-02-25 RX ORDER — ACETAMINOPHEN 500 MG
500 TABLET ORAL ONCE AS NEEDED
Status: DISCONTINUED | OUTPATIENT
Start: 2020-02-25 | End: 2020-02-25

## 2020-02-25 RX ORDER — DOCUSATE SODIUM 100 MG/1
100 CAPSULE, LIQUID FILLED ORAL
Status: DISCONTINUED | OUTPATIENT
Start: 2020-02-25 | End: 2020-02-26

## 2020-02-25 NOTE — PLAN OF CARE
Problem: POSTPARTUM  Goal: Long Term Goal:Experiences normal postpartum course  Description  INTERVENTIONS:  - Assess and monitor vital signs and lab values. - Assess fundus and lochia. - Provide ice/sitz baths for perineum discomfort.   - Monitor heali for signs of nipple pain/trauma. - Instruct and provide assistance with proper latch. - Review techniques for milk expression (breast pumping) and storage of breast milk. Provide pumping equipment/supplies, instructions and assistance, as needed.   Griffin Mendoza s/p biliary stent with TB slowly improving. Pt with new diagnosis of metastatic pancreatic cancer. Currently continuing to have pain medication titrated. Reviewed pt and family questions at bedside. He has outpatient follow up arranged at [facility].

## 2020-02-25 NOTE — PROGRESS NOTES
Pt admitted to room 111 per cart. Delivered in ambulance. Transferred to bed. POC reviewed. Firm U/1 with small rubra. Placenta undelivered. Call light within reach.

## 2020-02-25 NOTE — PROGRESS NOTES
Patient admitted to MB via Armin Steven to room  Safety precautions initiated  Bed in low position  Call light in reach  Knows to call for assistance first time out of bed

## 2020-02-25 NOTE — PROGRESS NOTES
Asked to see the patient who delivered in the ambulance on the way to the hospital, approximately 30 minutes ago - still with placenta in place - now with some bleeding. Reports uncomplicated pg - this is her 2nd baby.  Denies any significant medical proble

## 2020-02-25 NOTE — H&P
[facility]  History & Physical    Willie Chris Patient Status:  Inpatient    1990 MRN [id number]   Location [address] [facility] Attending Babita Cummins MD    0 PCP Lorrene Duane, MD     CC: patient is here for delivery of p Allergies    OBJECTIVE:    Temp:  [96.9 °F (36.1 °C)] 96.9 °F (36.1 °C)  Pulse:  [71-73] 73  Resp:  [18] 18  BP: (122-125)/(76) 122/76    Lungs:   clear to auscultation bilaterally   Heart:   regular rate and rhythm, regular rate and rhythm, S1, S2 normal,

## 2020-02-25 NOTE — PROGRESS NOTES
Pt transferred to Mother Baby room 2213 in stable condition. Report given to [facility]. Infant transferred with mother in stable condition.

## 2020-02-26 VITALS
WEIGHT: 206 LBS | HEIGHT: 65 IN | TEMPERATURE: 98 F | HEART RATE: 71 BPM | RESPIRATION RATE: 18 BRPM | DIASTOLIC BLOOD PRESSURE: 72 MMHG | BODY MASS INDEX: 34.32 KG/M2 | SYSTOLIC BLOOD PRESSURE: 119 MMHG

## 2020-02-26 PROBLEM — O99.820 GBS (GROUP B STREPTOCOCCUS CARRIER), +RV CULTURE, CURRENTLY PREGNANT: Status: RESOLVED | Noted: 2020-02-12 | Resolved: 2020-02-26

## 2020-02-26 PROBLEM — O99.820 GBS (GROUP B STREPTOCOCCUS CARRIER), +RV CULTURE, CURRENTLY PREGNANT (HCC): Status: RESOLVED | Noted: 2020-02-12 | Resolved: 2020-02-26

## 2020-02-26 PROBLEM — Z34.90 PREGNANCY (HCC): Status: RESOLVED | Noted: 2020-02-25 | Resolved: 2020-02-26

## 2020-02-26 PROBLEM — Z34.93 ENCOUNTER FOR SUPERVISION OF NORMAL PREGNANCY IN THIRD TRIMESTER (HCC): Status: RESOLVED | Noted: 2019-08-20 | Resolved: 2020-02-26

## 2020-02-26 PROBLEM — Z34.93 ENCOUNTER FOR SUPERVISION OF NORMAL PREGNANCY IN THIRD TRIMESTER: Status: RESOLVED | Noted: 2019-08-20 | Resolved: 2020-02-26

## 2020-02-26 PROBLEM — Z34.90 PREGNANCY: Status: RESOLVED | Noted: 2020-02-25 | Resolved: 2020-02-26

## 2020-02-26 LAB
BASOPHILS # BLD AUTO: 0.03 X10(3) UL (ref 0–0.2)
BASOPHILS NFR BLD AUTO: 0.3 %
DEPRECATED RDW RBC AUTO: 43 FL (ref 35.1–46.3)
EOSINOPHIL # BLD AUTO: 0.06 X10(3) UL (ref 0–0.7)
EOSINOPHIL NFR BLD AUTO: 0.7 %
ERYTHROCYTE [DISTWIDTH] IN BLOOD BY AUTOMATED COUNT: 12.4 % (ref 11–15)
HCT VFR BLD AUTO: 28.2 % (ref 35–48)
HGB BLD-MCNC: 9.5 G/DL (ref 12–16)
IMM GRANULOCYTES # BLD AUTO: 0.11 X10(3) UL (ref 0–1)
IMM GRANULOCYTES NFR BLD: 1.2 %
LYMPHOCYTES # BLD AUTO: 1.75 X10(3) UL (ref 1–4)
LYMPHOCYTES NFR BLD AUTO: 19.8 %
MCH RBC QN AUTO: 31.7 PG (ref 26–34)
MCHC RBC AUTO-ENTMCNC: 33.7 G/DL (ref 31–37)
MCV RBC AUTO: 94 FL (ref 80–100)
MONOCYTES # BLD AUTO: 0.56 X10(3) UL (ref 0.1–1)
MONOCYTES NFR BLD AUTO: 6.3 %
NEUTROPHILS # BLD AUTO: 6.34 X10 (3) UL (ref 1.5–7.7)
NEUTROPHILS # BLD AUTO: 6.34 X10(3) UL (ref 1.5–7.7)
NEUTROPHILS NFR BLD AUTO: 71.7 %
PLATELET # BLD AUTO: 186 10(3)UL (ref 150–450)
RBC # BLD AUTO: 3 X10(6)UL (ref 3.8–5.3)
WBC # BLD AUTO: 8.9 X10(3) UL (ref 4–11)

## 2020-02-26 RX ORDER — FERROUS SULFATE 325(65) MG
325 TABLET ORAL
Qty: 30 TABLET | Refills: 0 | Status: SHIPPED | OUTPATIENT
Start: 2020-02-26 | End: 2020-03-19

## 2020-02-26 RX ORDER — IBUPROFEN 600 MG/1
600 TABLET ORAL EVERY 6 HOURS
Qty: 30 TABLET | Refills: 0 | Status: SHIPPED | OUTPATIENT
Start: 2020-02-26 | End: 2020-10-26

## 2020-02-26 RX ORDER — PSEUDOEPHEDRINE HCL 30 MG
100 TABLET ORAL 2 TIMES DAILY
Qty: 60 CAPSULE | Refills: 0 | Status: SHIPPED | OUTPATIENT
Start: 2020-02-26 | End: 2020-10-26

## 2020-02-26 NOTE — DISCHARGE SUMMARY
[facility]  Discharge Summary    Catia Lanier Patient Status:  inpatient    1990 MRN [id number]   Location 2213/2213-A Attending [facility] Chuck Jeffries Day # 1 PCP Jese Hull MD     Date of Admission: 2020    Date of Discharge: 20

## 2020-02-26 NOTE — PROGRESS NOTES
DISCHARGE NOTE  Discharge & Follow-Up information reviewed with mom, no questions following. ID Bands checked and verified at bedside.   HUGS and Kisses tags removed  Baby in: car seat   Pt. ambulatory   Escorted off unit by: PCT

## 2020-02-26 NOTE — PROGRESS NOTES
[facility]  Post-Partum  Progress Note    Yoly Bella Patient Status:  Inpatient    1990 MRN [id number]   [facility] 2SW-J Attending Debra Julien MD   Hosp Day # 1 PCP Mary Jo Marshall MD     SUBJECTIVE:    Postpartum Day 1

## 2020-02-28 ENCOUNTER — TELEPHONE (OUTPATIENT)
Dept: OBGYN UNIT | Facility: HOSPITAL | Age: 30
End: 2020-02-28

## 2020-03-19 ENCOUNTER — TELEPHONE (OUTPATIENT)
Dept: OBGYN CLINIC | Facility: CLINIC | Age: 30
End: 2020-03-19

## 2020-03-19 RX ORDER — ACETAMINOPHEN AND CODEINE PHOSPHATE 120; 12 MG/5ML; MG/5ML
0.35 SOLUTION ORAL DAILY
Qty: 84 TABLET | Refills: 0 | Status: SHIPPED | OUTPATIENT
Start: 2020-03-19 | End: 2020-06-15

## 2020-03-19 RX ORDER — FERROUS SULFATE 325(65) MG
TABLET ORAL
Qty: 30 TABLET | Refills: 0 | Status: SHIPPED | OUTPATIENT
Start: 2020-03-19 | End: 2020-04-03

## 2020-03-19 NOTE — TELEPHONE ENCOUNTER
Call to patient; no answer. Left message to call back. Need to know if patient is breast feeding and verify which ocp she used before.

## 2020-03-19 NOTE — TELEPHONE ENCOUNTER
Patient returned call. She is currently breast feeding. Desires to start ocp. [facility]. Will route to provider for order.

## 2020-03-19 NOTE — TELEPHONE ENCOUNTER
Cancelled PP visit due to protocol. Pt states she is feeling well, but was wanting to get back on her birth control.     [facility]     Please call

## 2020-03-19 NOTE — TELEPHONE ENCOUNTER
OK to start Ortho Micronor 1 pill PO QD # 84 no refills. She should not be sexually active for 2 weeks because it is not recommended for 6 weeks post- partum.  She should use condoms for the first 4 weeks of the pill pack (if she starts it now then the last

## 2020-03-19 NOTE — TELEPHONE ENCOUNTER
Patient s/p  on 20.  CBC on 20 with hgb of 9.5    Routed to provider to see if refill is appropriate

## 2020-04-03 ENCOUNTER — TELEPHONE (OUTPATIENT)
Dept: OBGYN CLINIC | Facility: CLINIC | Age: 30
End: 2020-04-03

## 2020-04-03 RX ORDER — FERROUS SULFATE 325(65) MG
TABLET ORAL
Qty: 30 TABLET | Refills: 0 | Status: SHIPPED | OUTPATIENT
Start: 2020-04-03 | End: 2020-04-22

## 2020-04-03 NOTE — TELEPHONE ENCOUNTER
Last OV: 03/19/2020  Last refill date: 03/19/2020  Follow-up: RTC for annual  Next appt.: No pending appts. Please advise if OK for refill on Ferrous Sulfate.

## 2020-04-22 ENCOUNTER — TELEPHONE (OUTPATIENT)
Dept: OBGYN CLINIC | Facility: CLINIC | Age: 30
End: 2020-04-22

## 2020-04-22 RX ORDER — FERROUS SULFATE 325(65) MG
TABLET ORAL
Qty: 30 TABLET | Refills: 0 | Status: SHIPPED | OUTPATIENT
Start: 2020-04-22 | End: 2020-10-26

## 2020-04-22 NOTE — TELEPHONE ENCOUNTER
Last OV: 20 tete with Dr. Jelena Malave; s/p  on 20. Postpartum hcg was 9.5  Last refill date: 4/3/20  Follow-up: postpartum/annual  Next appt.: none scheduled    Routed to provider to determine if refill is appropriate.

## 2020-06-04 NOTE — TELEPHONE ENCOUNTER
Pt had an  on 20 and has not had a post partum visit yet. Pt cancelled appt due to Covid 19. Please schedule appt and route to RN for refill.

## 2020-06-15 RX ORDER — ACETAMINOPHEN AND CODEINE PHOSPHATE 120; 12 MG/5ML; MG/5ML
SOLUTION ORAL
Qty: 84 TABLET | Refills: 0 | Status: SHIPPED | OUTPATIENT
Start: 2020-06-15 | End: 2020-09-03

## 2020-06-15 NOTE — TELEPHONE ENCOUNTER
Pt scheduled. Wanted [place] location.     Future Appointments   Date Time Provider Luis Alberto Boonei   8/5/2020  8:00 AM NGUYỄN Pino [facility] P [facility] [address]

## 2020-08-27 ENCOUNTER — TELEPHONE (OUTPATIENT)
Dept: OBGYN CLINIC | Facility: CLINIC | Age: 30
End: 2020-08-27

## 2020-08-27 NOTE — TELEPHONE ENCOUNTER
34 y/o called regarding birth control use. She stated she cut down to breast feeding and is only doing it 25% of the time. She is taking the mini pill. Patient cancelled her PP appt b/c of COVID.   Last OV date: She nad  on 2020  Recent Test/Labs

## 2020-08-31 NOTE — TELEPHONE ENCOUNTER
Last OV: 2/19/20 tete with Dr. Peña Arce  Last refill date: 6/15/20  Follow-up: annual  Next appt.: none scheduled; due 9/2020    Patient due for annual in September. Please contact her to schedule appt and then return to RN pool for refill.  Thank you

## 2020-09-03 RX ORDER — ACETAMINOPHEN AND CODEINE PHOSPHATE 120; 12 MG/5ML; MG/5ML
SOLUTION ORAL
Qty: 84 TABLET | Refills: 0 | Status: SHIPPED | OUTPATIENT
Start: 2020-09-03 | End: 2020-10-26 | Stop reason: ALTCHOICE

## 2020-09-03 NOTE — TELEPHONE ENCOUNTER
Pt scheduled   Future Appointments   Date Time Provider Luis Alberto Armando   10/5/2020  5:30 PM NGUYỄN Armstrong [facility] [place]

## 2020-10-26 ENCOUNTER — OFFICE VISIT (OUTPATIENT)
Dept: OBGYN CLINIC | Facility: CLINIC | Age: 30
End: 2020-10-26
Payer: COMMERCIAL

## 2020-10-26 VITALS
BODY MASS INDEX: 31.87 KG/M2 | WEIGHT: 184.38 LBS | SYSTOLIC BLOOD PRESSURE: 124 MMHG | HEART RATE: 83 BPM | HEIGHT: 63.75 IN | TEMPERATURE: 98 F | DIASTOLIC BLOOD PRESSURE: 70 MMHG

## 2020-10-26 DIAGNOSIS — Z12.4 SCREENING FOR CERVICAL CANCER: ICD-10-CM

## 2020-10-26 DIAGNOSIS — Z23 NEED FOR VACCINATION: ICD-10-CM

## 2020-10-26 DIAGNOSIS — Z01.419 ENCOUNTER FOR GYNECOLOGICAL EXAMINATION WITHOUT ABNORMAL FINDING: Primary | ICD-10-CM

## 2020-10-26 DIAGNOSIS — N91.2 AMENORRHEA: ICD-10-CM

## 2020-10-26 PROCEDURE — 81025 URINE PREGNANCY TEST: CPT | Performed by: OBSTETRICS & GYNECOLOGY

## 2020-10-26 PROCEDURE — 88175 CYTOPATH C/V AUTO FLUID REDO: CPT | Performed by: OBSTETRICS & GYNECOLOGY

## 2020-10-26 PROCEDURE — 3078F DIAST BP <80 MM HG: CPT | Performed by: OBSTETRICS & GYNECOLOGY

## 2020-10-26 PROCEDURE — 3008F BODY MASS INDEX DOCD: CPT | Performed by: OBSTETRICS & GYNECOLOGY

## 2020-10-26 PROCEDURE — 3074F SYST BP LT 130 MM HG: CPT | Performed by: OBSTETRICS & GYNECOLOGY

## 2020-10-26 PROCEDURE — 90686 IIV4 VACC NO PRSV 0.5 ML IM: CPT | Performed by: OBSTETRICS & GYNECOLOGY

## 2020-10-26 PROCEDURE — 90471 IMMUNIZATION ADMIN: CPT | Performed by: OBSTETRICS & GYNECOLOGY

## 2020-10-26 PROCEDURE — 99395 PREV VISIT EST AGE 18-39: CPT | Performed by: OBSTETRICS & GYNECOLOGY

## 2020-10-26 RX ORDER — LEVONORGESTREL AND ETHINYL ESTRADIOL 0.1-0.02MG
1 KIT ORAL DAILY
Qty: 3 PACKAGE | Refills: 3 | Status: SHIPPED | OUTPATIENT
Start: 2020-10-26 | End: 2021-01-17

## 2020-10-26 NOTE — PROGRESS NOTES
Subjective:  Patient presents with:  Physical: Pt will like to discuss bc.     35year old female who presents for annual well woman visit. 7 months postpartum with no menses on minipill.   Done nursing and would like to switch back to Krunal patterson, which she ha Examination:  General appearance: Well dressed, well nourished in no apparent distress  Neurologic/Psychiatric: Alert and oriented to person, place and time, mood normal, affect appropriate  Head: Normocephalic without obvious deformity, atraumatic  Neck: Estrad (Atul Doyle) 0.1-20 MG-MCG Oral Tab; Take 1 tablet by mouth daily. Return in about 1 year (around 10/26/2021) for Annual Gyn Visit.

## 2020-12-21 RX ORDER — ACETAMINOPHEN AND CODEINE PHOSPHATE 120; 12 MG/5ML; MG/5ML
SOLUTION ORAL
Qty: 84 TABLET | Refills: 0 | OUTPATIENT
Start: 2020-12-21

## 2020-12-21 NOTE — TELEPHONE ENCOUNTER
Last OV: 10/26/20 with Dr. Margarette Francis for annual  Patient switched to [facility]. Refill not appropriate.

## 2021-01-18 RX ORDER — LEVONORGESTREL AND ETHINYL ESTRADIOL 0.1-0.02MG
1 KIT ORAL DAILY
Qty: 3 PACKAGE | Refills: 2 | Status: SHIPPED | OUTPATIENT
Start: 2021-01-18 | End: 2021-09-28

## 2021-01-18 NOTE — TELEPHONE ENCOUNTER
Last OV: 10/26/20 with Dr. Catracho Banks for annual  Last refill date: 10/26/20  Follow-up: 1 year  Next appt.: none scheduled; due 10/2021    Refill sent

## 2021-03-01 ENCOUNTER — OFFICE VISIT (OUTPATIENT)
Dept: FAMILY MEDICINE CLINIC | Facility: CLINIC | Age: 31
End: 2021-03-01
Payer: COMMERCIAL

## 2021-03-01 VITALS
SYSTOLIC BLOOD PRESSURE: 122 MMHG | OXYGEN SATURATION: 99 % | DIASTOLIC BLOOD PRESSURE: 72 MMHG | WEIGHT: 185.38 LBS | RESPIRATION RATE: 16 BRPM | BODY MASS INDEX: 30.89 KG/M2 | HEIGHT: 64.95 IN | HEART RATE: 71 BPM | TEMPERATURE: 98 F

## 2021-03-01 DIAGNOSIS — L25.9 CONTACT DERMATITIS, UNSPECIFIED CONTACT DERMATITIS TYPE, UNSPECIFIED TRIGGER: ICD-10-CM

## 2021-03-01 DIAGNOSIS — Z13.31 NEGATIVE DEPRESSION SCREENING: ICD-10-CM

## 2021-03-01 DIAGNOSIS — Z00.00 ANNUAL PHYSICAL EXAM: Primary | ICD-10-CM

## 2021-03-01 PROCEDURE — 3008F BODY MASS INDEX DOCD: CPT | Performed by: FAMILY MEDICINE

## 2021-03-01 PROCEDURE — 99213 OFFICE O/P EST LOW 20 MIN: CPT | Performed by: FAMILY MEDICINE

## 2021-03-01 PROCEDURE — 3074F SYST BP LT 130 MM HG: CPT | Performed by: FAMILY MEDICINE

## 2021-03-01 PROCEDURE — 3078F DIAST BP <80 MM HG: CPT | Performed by: FAMILY MEDICINE

## 2021-03-01 PROCEDURE — 99385 PREV VISIT NEW AGE 18-39: CPT | Performed by: FAMILY MEDICINE

## 2021-03-01 NOTE — PROGRESS NOTES
Isidro Mcclellan is a 35year old female that presents for annual physical exam.     Patient presents with:  Physical: PHQ2: 0  CSSR: 0      Last Pap: Pap Smear,3 Years due on 10/26/2023  Hx of abnormal pap: no  STI testing desired: no  Vaccines: utd  Diet Food insecurity        Worry: Not on file        Inability: Not on file      Transportation needs        Medical: Not on file        Non-medical: Not on file    Tobacco Use      Smoking status: Never Smoker      Smokeless tobacco: Never Used    Substance a discharge or itching, periods regular   MUSCULOSKELETAL: denies back pain  NEURO: denies headaches  PSYCHE: denies depression or anxiety  HEMATOLOGIC: denies hx of anemia  ENDOCRINE: denies thyroid history  ALL/ASTHMA: denies hx of allergy or asthma    EXA unspecified trigger  - hydrocortisone 2.5 % External Cream; Apply twice a day to affected area. Do not use for more than 7-10 days in a row. Dispense: 20 g;  Refill: 0  - education on eczema basics done and handout reviewed in detail   - vaseline for eye

## 2021-03-01 NOTE — PATIENT INSTRUCTIONS
Thank you for allowing me to participate in your care today. I will contact you with any results from today's visit. Lab results are typically available in 2-3 days for blood tests, and 3-5 days for any cultures or Paps.    Please let me know if you hav prediabetes All women with no symptoms who are overweight or obese and have 1 or more other risk factors for diabetes At least every 3 years.  Also, testing for diabetes during pregnancy after the 24th week.    Type 2 diabetes, prediabetes All women diagnos months after the first dose and the third dose given 6 months after the first dose   Influenza (flu) All women in this age group Once a year   Measles, mumps, rubella (MMR) All women in this age group who have no record of these infections or vaccines 1 or not up-to-date on their childhood vaccines should get all appropriate catch-up vaccines recommended by the CDC. Date Last Reviewed: 10/1/2017  © 3582-3542 [employer]. [address] [facility] [place]. All rights reserved.  This info

## 2021-03-08 ENCOUNTER — LAB ENCOUNTER (OUTPATIENT)
Dept: LAB | Age: 31
End: 2021-03-08
Attending: FAMILY MEDICINE
Payer: COMMERCIAL

## 2021-03-08 DIAGNOSIS — Z00.00 ANNUAL PHYSICAL EXAM: ICD-10-CM

## 2021-03-08 LAB
ALBUMIN SERPL-MCNC: 4 G/DL (ref 3.4–5)
ALBUMIN/GLOB SERPL: 1.1 {RATIO} (ref 1–2)
ALP LIVER SERPL-CCNC: 59 U/L
ALT SERPL-CCNC: 18 U/L
ANION GAP SERPL CALC-SCNC: 4 MMOL/L (ref 0–18)
AST SERPL-CCNC: 11 U/L (ref 15–37)
BASOPHILS # BLD AUTO: 0.03 X10(3) UL (ref 0–0.2)
BASOPHILS NFR BLD AUTO: 0.7 %
BILIRUB SERPL-MCNC: 0.6 MG/DL (ref 0.1–2)
BUN BLD-MCNC: 12 MG/DL (ref 7–18)
BUN/CREAT SERPL: 15.8 (ref 10–20)
CALCIUM BLD-MCNC: 9.2 MG/DL (ref 8.5–10.1)
CHLORIDE SERPL-SCNC: 109 MMOL/L (ref 98–112)
CHOLEST SMN-MCNC: 182 MG/DL (ref ?–200)
CO2 SERPL-SCNC: 26 MMOL/L (ref 21–32)
CREAT BLD-MCNC: 0.76 MG/DL
DEPRECATED RDW RBC AUTO: 44.8 FL (ref 35.1–46.3)
EOSINOPHIL # BLD AUTO: 0.05 X10(3) UL (ref 0–0.7)
EOSINOPHIL NFR BLD AUTO: 1.2 %
ERYTHROCYTE [DISTWIDTH] IN BLOOD BY AUTOMATED COUNT: 12.8 % (ref 11–15)
GLOBULIN PLAS-MCNC: 3.8 G/DL (ref 2.8–4.4)
GLUCOSE BLD-MCNC: 85 MG/DL (ref 70–99)
HCT VFR BLD AUTO: 43.5 %
HDLC SERPL-MCNC: 59 MG/DL (ref 40–59)
HGB BLD-MCNC: 13.8 G/DL
IMM GRANULOCYTES # BLD AUTO: 0.01 X10(3) UL (ref 0–1)
IMM GRANULOCYTES NFR BLD: 0.2 %
LDLC SERPL CALC-MCNC: 114 MG/DL (ref ?–100)
LYMPHOCYTES # BLD AUTO: 1.62 X10(3) UL (ref 1–4)
LYMPHOCYTES NFR BLD AUTO: 39.8 %
M PROTEIN MFR SERPL ELPH: 7.8 G/DL (ref 6.4–8.2)
MCH RBC QN AUTO: 30.3 PG (ref 26–34)
MCHC RBC AUTO-ENTMCNC: 31.7 G/DL (ref 31–37)
MCV RBC AUTO: 95.4 FL
MONOCYTES # BLD AUTO: 0.33 X10(3) UL (ref 0.1–1)
MONOCYTES NFR BLD AUTO: 8.1 %
NEUTROPHILS # BLD AUTO: 2.03 X10 (3) UL (ref 1.5–7.7)
NEUTROPHILS # BLD AUTO: 2.03 X10(3) UL (ref 1.5–7.7)
NEUTROPHILS NFR BLD AUTO: 50 %
NONHDLC SERPL-MCNC: 123 MG/DL (ref ?–130)
OSMOLALITY SERPL CALC.SUM OF ELEC: 287 MOSM/KG (ref 275–295)
PATIENT FASTING Y/N/NP: YES
PATIENT FASTING Y/N/NP: YES
PLATELET # BLD AUTO: 280 10(3)UL (ref 150–450)
POTASSIUM SERPL-SCNC: 4 MMOL/L (ref 3.5–5.1)
RBC # BLD AUTO: 4.56 X10(6)UL
SODIUM SERPL-SCNC: 139 MMOL/L (ref 136–145)
TRIGL SERPL-MCNC: 44 MG/DL (ref 30–149)
TSI SER-ACNC: 2.21 MIU/ML (ref 0.36–3.74)
VLDLC SERPL CALC-MCNC: 9 MG/DL (ref 0–30)
WBC # BLD AUTO: 4.1 X10(3) UL (ref 4–11)

## 2021-03-08 PROCEDURE — 80053 COMPREHEN METABOLIC PANEL: CPT

## 2021-03-08 PROCEDURE — 85025 COMPLETE CBC W/AUTO DIFF WBC: CPT

## 2021-03-08 PROCEDURE — 84443 ASSAY THYROID STIM HORMONE: CPT

## 2021-03-08 PROCEDURE — 80061 LIPID PANEL: CPT

## 2021-03-08 PROCEDURE — 36415 COLL VENOUS BLD VENIPUNCTURE: CPT

## 2021-04-14 ENCOUNTER — IMMUNIZATION (OUTPATIENT)
Dept: LAB | Age: 31
End: 2021-04-14
Attending: HOSPITALIST
Payer: COMMERCIAL

## 2021-04-14 DIAGNOSIS — Z23 NEED FOR VACCINATION: Primary | ICD-10-CM

## 2021-04-14 PROCEDURE — 0001A SARSCOV2 VAC 30MCG/0.3ML IM: CPT

## 2021-05-05 ENCOUNTER — IMMUNIZATION (OUTPATIENT)
Dept: LAB | Age: 31
End: 2021-05-05
Attending: HOSPITALIST
Payer: COMMERCIAL

## 2021-05-05 DIAGNOSIS — Z23 NEED FOR VACCINATION: Primary | ICD-10-CM

## 2021-05-05 PROCEDURE — 0002A SARSCOV2 VAC 30MCG/0.3ML IM: CPT

## 2021-05-24 NOTE — PROGRESS NOTES
HPI:   Yoly Bella is a 35year old female. Patient presents with:  Mental Illness: sees therapist - would like to discuss    Mood has been off for several months. Started after her  got covid.   Some martial issues,  is suffering fro Normocephalic, atraumatic   SKIN: No rashes, no skin lesion, no bruising, good turgor. HEART:  Regular rate and rhythm, no murmurs, rubs or gallops. LUNGS: Clear to auscultation bilterally, no rales/rhonchi/wheezing. NEURO:  A&Ox3. Gait stable.   Mood d

## 2021-06-15 DIAGNOSIS — F32.1 CURRENT MODERATE EPISODE OF MAJOR DEPRESSIVE DISORDER WITHOUT PRIOR EPISODE (HCC): ICD-10-CM

## 2021-06-15 RX ORDER — FLUOXETINE 10 MG/1
CAPSULE ORAL
Qty: 90 CAPSULE | Refills: 2 | Status: SHIPPED | OUTPATIENT
Start: 2021-06-15

## 2021-06-15 NOTE — TELEPHONE ENCOUNTER
Name from pharmacy: FLUOXETINE HCL 10 MG CAPSULE          Will file in chart as: FLUOXETINE HCL 10 MG Oral Cap    Sig: TAKE 1 CAPSULE BY MOUTH EVERY DAY    Disp:  30 capsule    Refills:  1    Start: 6/15/2021    Class: Normal    Non-formulary For: Current

## 2021-09-20 ENCOUNTER — TELEPHONE (OUTPATIENT)
Dept: OBGYN CLINIC | Facility: CLINIC | Age: 31
End: 2021-09-20

## 2021-09-28 RX ORDER — LEVONORGESTREL AND ETHINYL ESTRADIOL 0.1-0.02MG
KIT ORAL
Qty: 28 TABLET | Refills: 0 | Status: SHIPPED | OUTPATIENT
Start: 2021-09-28 | End: 2021-10-14

## 2021-09-28 NOTE — TELEPHONE ENCOUNTER
Pt called, she made an appt for her annual on Oct 14th with Angela at [facility] in [place]. Please refill her medication .
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
## 2021-10-11 NOTE — TELEPHONE ENCOUNTER
Contacted patient. She reports that she has not had meds for a week now. No unprotected intercourse. Has an appt with Laurie Garcia for annual on Thursday this week. She is a Sunday start and desires to continue with that schedule. She will wait until appt for rx.

## 2021-10-14 ENCOUNTER — OFFICE VISIT (OUTPATIENT)
Dept: OBGYN CLINIC | Facility: CLINIC | Age: 31
End: 2021-10-14
Payer: COMMERCIAL

## 2021-10-14 VITALS
HEART RATE: 69 BPM | WEIGHT: 163.63 LBS | DIASTOLIC BLOOD PRESSURE: 80 MMHG | HEIGHT: 64.95 IN | SYSTOLIC BLOOD PRESSURE: 120 MMHG | BODY MASS INDEX: 27.26 KG/M2

## 2021-10-14 DIAGNOSIS — Z01.419 WELL WOMAN EXAM WITH ROUTINE GYNECOLOGICAL EXAM: Primary | ICD-10-CM

## 2021-10-14 DIAGNOSIS — Z30.41 SURVEILLANCE FOR BIRTH CONTROL, ORAL CONTRACEPTIVES: ICD-10-CM

## 2021-10-14 PROCEDURE — 3079F DIAST BP 80-89 MM HG: CPT | Performed by: NURSE PRACTITIONER

## 2021-10-14 PROCEDURE — 3074F SYST BP LT 130 MM HG: CPT | Performed by: NURSE PRACTITIONER

## 2021-10-14 PROCEDURE — 3008F BODY MASS INDEX DOCD: CPT | Performed by: NURSE PRACTITIONER

## 2021-10-14 PROCEDURE — 99395 PREV VISIT EST AGE 18-39: CPT | Performed by: NURSE PRACTITIONER

## 2021-10-14 RX ORDER — LEVONORGESTREL AND ETHINYL ESTRADIOL 0.1-0.02MG
1 KIT ORAL DAILY
Qty: 84 TABLET | Refills: 4 | Status: SHIPPED | OUTPATIENT
Start: 2021-10-14
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
## 2021-10-22 ENCOUNTER — OFFICE VISIT (OUTPATIENT)
Dept: FAMILY MEDICINE CLINIC | Facility: CLINIC | Age: 31
End: 2021-10-22
Payer: COMMERCIAL

## 2021-10-22 VITALS
TEMPERATURE: 98 F | BODY MASS INDEX: 27.16 KG/M2 | HEART RATE: 77 BPM | SYSTOLIC BLOOD PRESSURE: 123 MMHG | HEIGHT: 65 IN | WEIGHT: 163 LBS | OXYGEN SATURATION: 98 % | DIASTOLIC BLOOD PRESSURE: 71 MMHG | RESPIRATION RATE: 20 BRPM

## 2021-10-22 DIAGNOSIS — J06.9 UPPER RESPIRATORY TRACT INFECTION, UNSPECIFIED TYPE: ICD-10-CM

## 2021-10-22 DIAGNOSIS — Z20.818 STREP THROAT EXPOSURE: Primary | ICD-10-CM

## 2021-10-22 PROCEDURE — 3078F DIAST BP <80 MM HG: CPT | Performed by: PHYSICIAN ASSISTANT

## 2021-10-22 PROCEDURE — 87880 STREP A ASSAY W/OPTIC: CPT | Performed by: PHYSICIAN ASSISTANT

## 2021-10-22 PROCEDURE — 99213 OFFICE O/P EST LOW 20 MIN: CPT | Performed by: PHYSICIAN ASSISTANT

## 2021-10-22 PROCEDURE — 87081 CULTURE SCREEN ONLY: CPT | Performed by: PHYSICIAN ASSISTANT

## 2021-10-22 PROCEDURE — 3008F BODY MASS INDEX DOCD: CPT | Performed by: PHYSICIAN ASSISTANT

## 2021-10-22 PROCEDURE — 3074F SYST BP LT 130 MM HG: CPT | Performed by: PHYSICIAN ASSISTANT

## 2021-10-22 RX ORDER — AMOXICILLIN 875 MG/1
875 TABLET, COATED ORAL 2 TIMES DAILY
Qty: 20 TABLET | Refills: 0 | Status: SHIPPED | OUTPATIENT
Start: 2021-10-22

## 2021-10-22 NOTE — PATIENT INSTRUCTIONS
Pharyngitis (Sore Throat), Report Pending     Pharyngitis (sore throat) is often due to a virus. It can also be caused by strep (streptococcus) bacteria. This is often called strep throat.  Both viral and strep infections can cause throat pain that is wor medicine (often penicillin or amoxicillin) for the full 10 days or as directed by the healthcare provider. Don't stop the medicine even if you or your child feel better. This is very important to make sure the infection is fully treated.  It's also importan with your healthcare provider or our staff if you or your child don't feel or get better within 72 hours or as directed.    When to get medical advice  Call your healthcare provider right away if any of these occur:   · Your child has a fever (see \"Fever a child of any age with signs of illness. The provider may want to confirm with a rectal temperature. · Mouth (oral). Don’t use a thermometer in your child’s mouth until he or she is at least 3years old. Use the rectal thermometer with care.  Follow the pr coronavirus that causes COVID-19, [facility] is here to provide community members reliable answers to any questions they may have. Please review the entirety of this informational document.   It includes information related to exposure, pendi exposure. • If you have symptoms, immediately self-isolate and contact your local public health authority or healthcare provider.   • Wear a mask, stay at least 6 feet from others, wash your hands, avoid crowds, and take other steps to prevent the spread o fevers greater than 100.4 degrees Fahrenheit, you should contact your health care provider before seeking further care. Process measures to keep everyone safe in this difficult time are changing frequently.  Your healthcare provider can help direct you on telehealth follow-up.  CDC does not recommend repeat testing after a positive test.  Convalescent Plasma Donation Program  [facility], in conjunction with Emilie Moore., is looking for patients who have recovered from COVID-19 and would be inter Taylor.nl. pdf  [employer] [facility].v2 Ratings.au  http://www.[facility].[place].gov/topics-services/diseases-and-conditions/dise https://health.[facility]/coronavirus/covid-19-information/covid-19-long-haulers. html  Long-term effects of covid-19. (n.d.).  Retrieved May 11, 2021, from [employer].  What it means to be A Coronavirus

## 2021-10-22 NOTE — PROGRESS NOTES
CHIEF COMPLAINT:   Patient presents with:  Sore Throat        HPI:   Giana Mcmullen is 28year old female presents to clinic with complaint of  sore throat, congestion, headache, bodyache. No significant cough.      Symptoms 2 day(s)    Associated symp normocephalic  EYES: conjunctiva clear, sclera white  EARS: TM's clear, non-injected, no bulging, retraction, or fluid bilaterally  NOSE: nares patent, moderatenasal discharge and mucosal congestion. THROAT: Posterior pharynx erythematous and injected.  mo

## 2022-01-07 ENCOUNTER — TELEMEDICINE (OUTPATIENT)
Dept: FAMILY MEDICINE CLINIC | Facility: CLINIC | Age: 32
End: 2022-01-07

## 2022-01-07 ENCOUNTER — LAB ENCOUNTER (OUTPATIENT)
Dept: LAB | Age: 32
End: 2022-01-07
Attending: FAMILY MEDICINE
Payer: COMMERCIAL

## 2022-01-07 DIAGNOSIS — M79.10 MYALGIA: ICD-10-CM

## 2022-01-07 DIAGNOSIS — J02.9 SORE THROAT: ICD-10-CM

## 2022-01-07 DIAGNOSIS — Z20.822 CLOSE EXPOSURE TO COVID-19 VIRUS: Primary | ICD-10-CM

## 2022-01-07 DIAGNOSIS — Z20.822 CLOSE EXPOSURE TO COVID-19 VIRUS: ICD-10-CM

## 2022-01-07 PROCEDURE — 99213 OFFICE O/P EST LOW 20 MIN: CPT | Performed by: FAMILY MEDICINE

## 2022-01-07 NOTE — PROGRESS NOTES
Video Visit- Lissa Bonds  This is a telemedicine visit with live, interactive video and audio.      [facility] understands and accepts financial responsibility for any deductible, co-insurance and/or co-pay and need for no fevre and improvement in respiratory symptoms to end, and would need to wear a mask for 5 additional days afterwards. If criteria not met would need to cont quarantine for the 10 d.  -symptomatic tx.       Return for if symptoms worsen/don't

## 2022-01-07 NOTE — PATIENT INSTRUCTIONS
Coronavirus Disease 2019 (COVID-19): Caring for Yourself or Others   If you or a household member have symptoms of COVID-19, follow these guidelines for preventing spread of the virus and managing symptoms.  This is regardless of your vaccination status contact with. · Follow all instructions the healthcare staff give you. If you have been diagnosed with COVID-19  · Stay home and start self-isolation. Don’t leave your home unless you need to get medical care. Don't go to work, school, or public areas. pregnant or breastfeeding people to be vaccinated. Talk with your healthcare provider about which vaccine is best for you and your family. Treatment  Current treatment is mainly aimed at helping your body while it fights the virus.  This is known as suppo risk for severe COVID-19 and a hospital stay. This includes people who are 65 years and older and people with certain chronic conditions.  Monoclonal antibody therapy is not approved for people who:   · Are in the hospital with COVID-19, or  · Need oxygen t when all 3 of these are true:   1. You have had no fever for at least 24 hours. This means no fever without medicine that reduces fever, such as acetaminophen, for at least 24 hours. 2. Your symptoms such as cough or trouble breathing have improved.   3. I time. This leads to variants that are easily spread, including the delta variant.  To protect against variants, CDC advises all people over age 3, including those who are fully vaccinated, to wear a mask indoors in public settings if you are in an area of h

## 2022-01-09 LAB — SARS-COV-2 RNA RESP QL NAA+PROBE: NOT DETECTED

## 2022-03-10 ENCOUNTER — TELEPHONE (OUTPATIENT)
Dept: OBGYN CLINIC | Facility: CLINIC | Age: 32
End: 2022-03-10

## 2022-03-10 RX ORDER — LEVONORGESTREL AND ETHINYL ESTRADIOL 0.1-0.02MG
1 KIT ORAL DAILY
Qty: 84 TABLET | Refills: 1 | Status: SHIPPED | OUTPATIENT
Start: 2022-03-10

## 2022-03-10 RX ORDER — FLUOXETINE 10 MG/1
CAPSULE ORAL
Qty: 90 CAPSULE | Refills: 2 | Status: SHIPPED | OUTPATIENT
Start: 2022-03-10

## 2022-03-10 NOTE — TELEPHONE ENCOUNTER
Requesting Fluoxetine 10mg  LOV: 1/7/22 VV acute  RTC: prn  Last Relevant Labs: 3/8/21  Filled: 6/15/21 #90 with 2 refills    No future appointments.     Non-protocol med:  Rx pended and routed for approval/denial

## 2022-08-24 DIAGNOSIS — Z30.41 SURVEILLANCE FOR BIRTH CONTROL, ORAL CONTRACEPTIVES: ICD-10-CM

## 2022-08-24 NOTE — TELEPHONE ENCOUNTER
Last OV: 10/14/21 with Barbara Prather for annual  Last refill date: 3/10/22  Follow-up: 1 year  Next appt.: none scheduled; due 10/2022    Patient due for annual in October. Please contact her to schedule appt and then return to RN pool for refill.  Thank you

## 2022-08-29 ENCOUNTER — TELEPHONE (OUTPATIENT)
Dept: OBGYN CLINIC | Facility: CLINIC | Age: 32
End: 2022-08-29

## 2022-08-29 DIAGNOSIS — Z30.41 SURVEILLANCE FOR BIRTH CONTROL, ORAL CONTRACEPTIVES: ICD-10-CM

## 2022-08-29 RX ORDER — LEVONORGESTREL AND ETHINYL ESTRADIOL 0.1-0.02MG
KIT ORAL
Qty: 84 TABLET | Refills: 1 | OUTPATIENT
Start: 2022-08-29

## 2022-08-29 RX ORDER — LEVONORGESTREL AND ETHINYL ESTRADIOL 0.1-0.02MG
1 KIT ORAL DAILY
Qty: 84 TABLET | Refills: 0 | Status: SHIPPED | OUTPATIENT
Start: 2022-08-29

## 2022-08-29 NOTE — TELEPHONE ENCOUNTER
PT calling back and and is scheduled for her appt    Future Appointments   Date Time Provider Luis Alberto Armando   10/20/2022 12:00 PM NGUYỄN Dyson [facility] [address]     Please refill medication

## 2022-08-29 NOTE — TELEPHONE ENCOUNTER
LOV for annual 10/14/21. Sent in 1 refill (90days). Patient will need appointment prior to next refill. Please call to schedule patient for appointment. No need to route back since I already sent refill.

## 2022-09-06 NOTE — TELEPHONE ENCOUNTER
Pt scheduled    Future Appointments   Date Time Provider Luis Alberto Armando   10/20/2022 12:00 PM NGUYỄN Walsh [facility] [address]

## 2022-10-20 ENCOUNTER — OFFICE VISIT (OUTPATIENT)
Dept: OBGYN CLINIC | Facility: CLINIC | Age: 32
End: 2022-10-20
Payer: COMMERCIAL

## 2022-10-20 VITALS
DIASTOLIC BLOOD PRESSURE: 72 MMHG | BODY MASS INDEX: 26.37 KG/M2 | HEART RATE: 64 BPM | WEIGHT: 158.25 LBS | HEIGHT: 65 IN | SYSTOLIC BLOOD PRESSURE: 118 MMHG

## 2022-10-20 DIAGNOSIS — Z23 NEED FOR VACCINATION: Primary | ICD-10-CM

## 2022-10-20 DIAGNOSIS — Z30.41 SURVEILLANCE FOR BIRTH CONTROL, ORAL CONTRACEPTIVES: ICD-10-CM

## 2022-10-20 DIAGNOSIS — Z01.419 WELL WOMAN EXAM WITH ROUTINE GYNECOLOGICAL EXAM: ICD-10-CM

## 2022-10-20 PROCEDURE — 3078F DIAST BP <80 MM HG: CPT | Performed by: NURSE PRACTITIONER

## 2022-10-20 PROCEDURE — 3074F SYST BP LT 130 MM HG: CPT | Performed by: NURSE PRACTITIONER

## 2022-10-20 PROCEDURE — 99395 PREV VISIT EST AGE 18-39: CPT | Performed by: NURSE PRACTITIONER

## 2022-10-20 PROCEDURE — 90686 IIV4 VACC NO PRSV 0.5 ML IM: CPT | Performed by: NURSE PRACTITIONER

## 2022-10-20 PROCEDURE — 90471 IMMUNIZATION ADMIN: CPT | Performed by: NURSE PRACTITIONER

## 2022-10-20 PROCEDURE — 3008F BODY MASS INDEX DOCD: CPT | Performed by: NURSE PRACTITIONER

## 2022-10-20 RX ORDER — LEVONORGESTREL AND ETHINYL ESTRADIOL 0.1-0.02MG
1 KIT ORAL DAILY
Qty: 84 TABLET | Refills: 4 | Status: SHIPPED | OUTPATIENT
Start: 2022-10-20

## 2023-01-12 ENCOUNTER — LAB ENCOUNTER (OUTPATIENT)
Dept: LAB | Age: 33
End: 2023-01-12
Attending: FAMILY MEDICINE
Payer: COMMERCIAL

## 2023-01-12 ENCOUNTER — OFFICE VISIT (OUTPATIENT)
Dept: FAMILY MEDICINE CLINIC | Facility: CLINIC | Age: 33
End: 2023-01-12
Payer: COMMERCIAL

## 2023-01-12 VITALS
RESPIRATION RATE: 16 BRPM | SYSTOLIC BLOOD PRESSURE: 120 MMHG | HEART RATE: 80 BPM | DIASTOLIC BLOOD PRESSURE: 80 MMHG | BODY MASS INDEX: 26.24 KG/M2 | TEMPERATURE: 98 F | WEIGHT: 157.5 LBS | HEIGHT: 65 IN | OXYGEN SATURATION: 98 %

## 2023-01-12 DIAGNOSIS — R53.83 FATIGUE, UNSPECIFIED TYPE: ICD-10-CM

## 2023-01-12 DIAGNOSIS — Z00.00 LABORATORY EXAM ORDERED AS PART OF ROUTINE GENERAL MEDICAL EXAMINATION: ICD-10-CM

## 2023-01-12 DIAGNOSIS — Z00.00 ROUTINE MEDICAL EXAM: Primary | ICD-10-CM

## 2023-01-12 PROBLEM — I73.00 RAYNAUD PHENOMENON: Status: ACTIVE | Noted: 2023-01-01

## 2023-01-12 LAB
ALBUMIN SERPL-MCNC: 3.9 G/DL (ref 3.4–5)
ALBUMIN/GLOB SERPL: 0.9 {RATIO} (ref 1–2)
ALP LIVER SERPL-CCNC: 62 U/L
ALT SERPL-CCNC: 15 U/L
ANION GAP SERPL CALC-SCNC: 5 MMOL/L (ref 0–18)
AST SERPL-CCNC: 19 U/L (ref 15–37)
BASOPHILS # BLD AUTO: 0.04 X10(3) UL (ref 0–0.2)
BASOPHILS NFR BLD AUTO: 0.7 %
BILIRUB SERPL-MCNC: 0.3 MG/DL (ref 0.1–2)
BUN BLD-MCNC: 9 MG/DL (ref 7–18)
CALCIUM BLD-MCNC: 9.7 MG/DL (ref 8.5–10.1)
CHLORIDE SERPL-SCNC: 108 MMOL/L (ref 98–112)
CHOLEST SERPL-MCNC: 174 MG/DL (ref ?–200)
CO2 SERPL-SCNC: 27 MMOL/L (ref 21–32)
CREAT BLD-MCNC: 0.91 MG/DL
EOSINOPHIL # BLD AUTO: 0.09 X10(3) UL (ref 0–0.7)
EOSINOPHIL NFR BLD AUTO: 1.5 %
ERYTHROCYTE [DISTWIDTH] IN BLOOD BY AUTOMATED COUNT: 12.6 %
FASTING PATIENT LIPID ANSWER: YES
FASTING STATUS PATIENT QL REPORTED: YES
GFR SERPLBLD BASED ON 1.73 SQ M-ARVRAT: 86 ML/MIN/1.73M2 (ref 60–?)
GLOBULIN PLAS-MCNC: 4.4 G/DL (ref 2.8–4.4)
GLUCOSE BLD-MCNC: 95 MG/DL (ref 70–99)
HCT VFR BLD AUTO: 40.5 %
HDLC SERPL-MCNC: 55 MG/DL (ref 40–59)
HGB BLD-MCNC: 13.3 G/DL
IMM GRANULOCYTES # BLD AUTO: 0.02 X10(3) UL (ref 0–1)
IMM GRANULOCYTES NFR BLD: 0.3 %
LDLC SERPL CALC-MCNC: 108 MG/DL (ref ?–100)
LYMPHOCYTES # BLD AUTO: 1.73 X10(3) UL (ref 1–4)
LYMPHOCYTES NFR BLD AUTO: 28.9 %
MCH RBC QN AUTO: 30.6 PG (ref 26–34)
MCHC RBC AUTO-ENTMCNC: 32.8 G/DL (ref 31–37)
MCV RBC AUTO: 93.1 FL
MONOCYTES # BLD AUTO: 0.54 X10(3) UL (ref 0.1–1)
MONOCYTES NFR BLD AUTO: 9 %
NEUTROPHILS # BLD AUTO: 3.56 X10 (3) UL (ref 1.5–7.7)
NEUTROPHILS # BLD AUTO: 3.56 X10(3) UL (ref 1.5–7.7)
NEUTROPHILS NFR BLD AUTO: 59.6 %
NONHDLC SERPL-MCNC: 119 MG/DL (ref ?–130)
OSMOLALITY SERPL CALC.SUM OF ELEC: 288 MOSM/KG (ref 275–295)
PLATELET # BLD AUTO: 353 10(3)UL (ref 150–450)
POTASSIUM SERPL-SCNC: 4.2 MMOL/L (ref 3.5–5.1)
PROT SERPL-MCNC: 8.3 G/DL (ref 6.4–8.2)
RBC # BLD AUTO: 4.35 X10(6)UL
SODIUM SERPL-SCNC: 140 MMOL/L (ref 136–145)
T4 FREE SERPL-MCNC: 0.9 NG/DL (ref 0.8–1.7)
TRIGL SERPL-MCNC: 57 MG/DL (ref 30–149)
TSI SER-ACNC: 1.92 MIU/ML (ref 0.36–3.74)
VLDLC SERPL CALC-MCNC: 10 MG/DL (ref 0–30)
WBC # BLD AUTO: 6 X10(3) UL (ref 4–11)

## 2023-01-12 PROCEDURE — 84443 ASSAY THYROID STIM HORMONE: CPT

## 2023-01-12 PROCEDURE — 80061 LIPID PANEL: CPT

## 2023-01-12 PROCEDURE — 3074F SYST BP LT 130 MM HG: CPT | Performed by: FAMILY MEDICINE

## 2023-01-12 PROCEDURE — 84439 ASSAY OF FREE THYROXINE: CPT

## 2023-01-12 PROCEDURE — 99395 PREV VISIT EST AGE 18-39: CPT | Performed by: FAMILY MEDICINE

## 2023-01-12 PROCEDURE — 36415 COLL VENOUS BLD VENIPUNCTURE: CPT

## 2023-01-12 PROCEDURE — 3008F BODY MASS INDEX DOCD: CPT | Performed by: FAMILY MEDICINE

## 2023-01-12 PROCEDURE — 85025 COMPLETE CBC W/AUTO DIFF WBC: CPT

## 2023-01-12 PROCEDURE — 80053 COMPREHEN METABOLIC PANEL: CPT

## 2023-01-12 PROCEDURE — 3079F DIAST BP 80-89 MM HG: CPT | Performed by: FAMILY MEDICINE

## 2023-05-22 ENCOUNTER — TELEMEDICINE (OUTPATIENT)
Dept: FAMILY MEDICINE CLINIC | Facility: CLINIC | Age: 33
End: 2023-05-22

## 2023-05-22 DIAGNOSIS — R63.5 WEIGHT GAIN: Primary | ICD-10-CM

## 2023-05-22 PROCEDURE — 99213 OFFICE O/P EST LOW 20 MIN: CPT | Performed by: FAMILY MEDICINE

## 2023-09-25 ENCOUNTER — LAB ENCOUNTER (OUTPATIENT)
Dept: LAB | Age: 33
End: 2023-09-25
Attending: FAMILY MEDICINE
Payer: COMMERCIAL

## 2023-09-25 ENCOUNTER — OFFICE VISIT (OUTPATIENT)
Dept: FAMILY MEDICINE CLINIC | Facility: CLINIC | Age: 33
End: 2023-09-25
Payer: COMMERCIAL

## 2023-09-25 VITALS
DIASTOLIC BLOOD PRESSURE: 62 MMHG | HEIGHT: 65 IN | HEART RATE: 85 BPM | OXYGEN SATURATION: 99 % | WEIGHT: 159 LBS | TEMPERATURE: 98 F | SYSTOLIC BLOOD PRESSURE: 120 MMHG | RESPIRATION RATE: 16 BRPM | BODY MASS INDEX: 26.49 KG/M2

## 2023-09-25 DIAGNOSIS — R53.83 OTHER FATIGUE: ICD-10-CM

## 2023-09-25 DIAGNOSIS — E66.3 OVERWEIGHT (BMI 25.0-29.9): ICD-10-CM

## 2023-09-25 DIAGNOSIS — Z28.21 INFLUENZA VACCINATION DECLINED BY PATIENT: ICD-10-CM

## 2023-09-25 DIAGNOSIS — R53.83 OTHER FATIGUE: Primary | ICD-10-CM

## 2023-09-25 LAB
ANION GAP SERPL CALC-SCNC: 3 MMOL/L (ref 0–18)
BASOPHILS # BLD AUTO: 0.06 X10(3) UL (ref 0–0.2)
BASOPHILS NFR BLD AUTO: 1.3 %
BUN BLD-MCNC: 13 MG/DL (ref 7–18)
CALCIUM BLD-MCNC: 9.3 MG/DL (ref 8.5–10.1)
CHLORIDE SERPL-SCNC: 107 MMOL/L (ref 98–112)
CO2 SERPL-SCNC: 29 MMOL/L (ref 21–32)
CREAT BLD-MCNC: 1.04 MG/DL
EGFRCR SERPLBLD CKD-EPI 2021: 73 ML/MIN/1.73M2 (ref 60–?)
EOSINOPHIL # BLD AUTO: 0.14 X10(3) UL (ref 0–0.7)
EOSINOPHIL NFR BLD AUTO: 3.1 %
ERYTHROCYTE [DISTWIDTH] IN BLOOD BY AUTOMATED COUNT: 12.4 %
FASTING STATUS PATIENT QL REPORTED: YES
GLUCOSE BLD-MCNC: 84 MG/DL (ref 70–99)
HCT VFR BLD AUTO: 39.7 %
HGB BLD-MCNC: 13.1 G/DL
IMM GRANULOCYTES # BLD AUTO: 0.01 X10(3) UL (ref 0–1)
IMM GRANULOCYTES NFR BLD: 0.2 %
LYMPHOCYTES # BLD AUTO: 1.82 X10(3) UL (ref 1–4)
LYMPHOCYTES NFR BLD AUTO: 40.2 %
MCH RBC QN AUTO: 31 PG (ref 26–34)
MCHC RBC AUTO-ENTMCNC: 33 G/DL (ref 31–37)
MCV RBC AUTO: 93.9 FL
MONOCYTES # BLD AUTO: 0.33 X10(3) UL (ref 0.1–1)
MONOCYTES NFR BLD AUTO: 7.3 %
NEUTROPHILS # BLD AUTO: 2.17 X10 (3) UL (ref 1.5–7.7)
NEUTROPHILS # BLD AUTO: 2.17 X10(3) UL (ref 1.5–7.7)
NEUTROPHILS NFR BLD AUTO: 47.9 %
OSMOLALITY SERPL CALC.SUM OF ELEC: 287 MOSM/KG (ref 275–295)
PLATELET # BLD AUTO: 312 10(3)UL (ref 150–450)
POTASSIUM SERPL-SCNC: 4.1 MMOL/L (ref 3.5–5.1)
RBC # BLD AUTO: 4.23 X10(6)UL
SODIUM SERPL-SCNC: 139 MMOL/L (ref 136–145)
TSI SER-ACNC: 1.26 MIU/ML (ref 0.36–3.74)
WBC # BLD AUTO: 4.5 X10(3) UL (ref 4–11)

## 2023-09-25 PROCEDURE — 3078F DIAST BP <80 MM HG: CPT | Performed by: FAMILY MEDICINE

## 2023-09-25 PROCEDURE — 3074F SYST BP LT 130 MM HG: CPT | Performed by: FAMILY MEDICINE

## 2023-09-25 PROCEDURE — 3008F BODY MASS INDEX DOCD: CPT | Performed by: FAMILY MEDICINE

## 2023-09-25 PROCEDURE — 84443 ASSAY THYROID STIM HORMONE: CPT

## 2023-09-25 PROCEDURE — 36415 COLL VENOUS BLD VENIPUNCTURE: CPT

## 2023-09-25 PROCEDURE — 80048 BASIC METABOLIC PNL TOTAL CA: CPT

## 2023-09-25 PROCEDURE — 85025 COMPLETE CBC W/AUTO DIFF WBC: CPT

## 2023-09-25 PROCEDURE — 99214 OFFICE O/P EST MOD 30 MIN: CPT | Performed by: FAMILY MEDICINE

## 2023-09-25 RX ORDER — PHENTERMINE HYDROCHLORIDE 30 MG/1
30 CAPSULE ORAL EVERY MORNING
Qty: 30 CAPSULE | Refills: 0 | Status: SHIPPED | OUTPATIENT
Start: 2023-09-25 | End: 2023-09-26 | Stop reason: RX

## 2023-09-26 ENCOUNTER — TELEPHONE (OUTPATIENT)
Dept: FAMILY MEDICINE CLINIC | Facility: CLINIC | Age: 33
End: 2023-09-26

## 2023-09-26 DIAGNOSIS — E66.09 CLASS 1 OBESITY DUE TO EXCESS CALORIES WITHOUT SERIOUS COMORBIDITY WITH BODY MASS INDEX (BMI) OF 30.0 TO 30.9 IN ADULT: Primary | ICD-10-CM

## 2023-09-26 RX ORDER — PHENTERMINE HYDROCHLORIDE 37.5 MG/1
37.5 TABLET ORAL
Qty: 30 TABLET | Refills: 0 | Status: SHIPPED | OUTPATIENT
Start: 2023-09-26

## 2023-09-26 NOTE — TELEPHONE ENCOUNTER
Per [facility] the Phentermine 30 mg Capsules are on backorder. They do have the Phentermine 37.5 mg Capsules & Tablets available.     Please advise

## 2023-09-26 NOTE — TELEPHONE ENCOUNTER
Please call pt and inform her of the availability issue below. Inform her we can print off the 30mg tab script and she can pick it up and take it where they have it or we can increase it up to the 37.5mg dose. Drawback is the higher dose can increase the likelihood of possible side effect of palpitations or chest discomfort.

## 2023-09-26 NOTE — TELEPHONE ENCOUNTER
Pt called back and would like to try the 37.5 mg of the Phentermine. Please send to [facility] pharmacy. Pt will try the higher dose to see if any side effects.

## 2023-09-27 ENCOUNTER — TELEPHONE (OUTPATIENT)
Dept: FAMILY MEDICINE CLINIC | Facility: CLINIC | Age: 33
End: 2023-09-27

## 2023-09-27 PROBLEM — E66.3 OVERWEIGHT (BMI 25.0-29.9): Status: ACTIVE | Noted: 2023-09-27

## 2023-09-27 PROBLEM — R53.83 OTHER FATIGUE: Status: ACTIVE | Noted: 2023-09-27

## 2023-09-27 NOTE — TELEPHONE ENCOUNTER
PA submitted via [employer] for the:   Medication Quantity Refills Start End   Phentermine HCl 37.5 MG Oral Tab         Awaiting a response.

## 2023-09-28 NOTE — TELEPHONE ENCOUNTER
Response letter received from [address] [place] [address] stating patient does not meet the requirements of her plan. The plan covers the drug when pt meets one of these conditions:  -BMI of 30 kg per square meter or more. -BMI of 27 kg per square meter or more. 9/25/23 patient's BMI was 26.46 kg/m2. PA request for the Phentermine has been denied. Denied   9/27/2023  7:58 PM  Case ID: [id number]       Per [facility] pharmacy, patient already picked up the script using a coupon card. She paid $13.00 out of pocket.

## 2023-09-29 ENCOUNTER — PATIENT MESSAGE (OUTPATIENT)
Dept: FAMILY MEDICINE CLINIC | Facility: CLINIC | Age: 33
End: 2023-09-29

## 2023-09-29 NOTE — TELEPHONE ENCOUNTER
From: Rosario Yusuf  To: Virginie Laurent  Sent: 9/29/2023 8:42 AM CDT  Subject: TSH test results     Hi Dr. Yaritza Shelton,    I just had a quick question about my TSH test results. Looking at the trends for that test it seems that every year the number is going down. Even though my numbers are still within normal range is that something to keep an eye on? Is it normal for that number to progressively go down each year?     Thank you for your time,  Cristina

## 2023-10-25 ENCOUNTER — OFFICE VISIT (OUTPATIENT)
Dept: FAMILY MEDICINE CLINIC | Facility: CLINIC | Age: 33
End: 2023-10-25

## 2023-10-25 VITALS
HEART RATE: 84 BPM | OXYGEN SATURATION: 100 % | WEIGHT: 152 LBS | HEIGHT: 65 IN | BODY MASS INDEX: 25.33 KG/M2 | SYSTOLIC BLOOD PRESSURE: 122 MMHG | RESPIRATION RATE: 16 BRPM | DIASTOLIC BLOOD PRESSURE: 72 MMHG | TEMPERATURE: 98 F

## 2023-10-25 DIAGNOSIS — E66.3 OVERWEIGHT (BMI 25.0-29.9): Primary | ICD-10-CM

## 2023-10-25 PROCEDURE — 3008F BODY MASS INDEX DOCD: CPT | Performed by: FAMILY MEDICINE

## 2023-10-25 PROCEDURE — 99213 OFFICE O/P EST LOW 20 MIN: CPT | Performed by: FAMILY MEDICINE

## 2023-10-25 PROCEDURE — 3078F DIAST BP <80 MM HG: CPT | Performed by: FAMILY MEDICINE

## 2023-10-25 PROCEDURE — 3074F SYST BP LT 130 MM HG: CPT | Performed by: FAMILY MEDICINE

## 2023-10-25 RX ORDER — PHENTERMINE HYDROCHLORIDE 37.5 MG/1
37.5 TABLET ORAL
Qty: 30 TABLET | Refills: 0 | Status: SHIPPED | OUTPATIENT
Start: 2023-10-25

## 2023-10-26 ENCOUNTER — PATIENT MESSAGE (OUTPATIENT)
Dept: FAMILY MEDICINE CLINIC | Facility: CLINIC | Age: 33
End: 2023-10-26

## 2023-11-21 ENCOUNTER — TELEMEDICINE (OUTPATIENT)
Dept: FAMILY MEDICINE CLINIC | Facility: CLINIC | Age: 33
End: 2023-11-21
Payer: COMMERCIAL

## 2023-11-21 DIAGNOSIS — E66.3 OVERWEIGHT (BMI 25.0-29.9): ICD-10-CM

## 2023-11-21 RX ORDER — PHENTERMINE HYDROCHLORIDE 37.5 MG/1
37.5 TABLET ORAL
Qty: 30 TABLET | Refills: 0 | Status: SHIPPED | OUTPATIENT
Start: 2023-11-24

## 2023-11-22 ENCOUNTER — TELEPHONE (OUTPATIENT)
Dept: FAMILY MEDICINE CLINIC | Facility: CLINIC | Age: 33
End: 2023-11-22

## 2023-11-22 NOTE — TELEPHONE ENCOUNTER
Response letter received from [address] [place] [address] stating patient does not meet the requirements of her plan. The plan covers the drug when pt meets one of these conditions:  -BMI of 30 kg per square meter or more. -BMI of 27 kg per square meter or more. 10/25/23 patient's BMI was 25.29 kg/m2. PA request for the Phentermine has been denied.

## 2023-11-22 NOTE — TELEPHONE ENCOUNTER
PA submitted via [employer] for the Phentermine HCl 37.5 MG Oral Tabs. Waiting for Payer Response   11/22/2023  9:14 AM  Case ID: [id number] Reply deadline: November 27, 2023  7:12 AM Sending user: Herson Krause MA   Note from payer: Please answer the provided questions and return this form when complete. (Message [phone])   Payer: [employer]    [phone]    [phone]   [employer] [address] [place] [address]  **BRANDI WEB** ePA Review ([employer] . .. [employer] [address] [place] [address]  **BRANDI WEB** ePA Review ([employer]); QSet has denial reasons loaded [facility] [id number]  Question Answer   Has the patient received 3 months of therapy with the requested drug within the past 365 days? NO   Does the patient have a body mass index (BMI) greater than or equal to 30 kg per square meter? NO   Does the patient have a body mass index (BMI) greater than or equal to 27 kg per square meter?  NO         Waiting for Payer Response   11/21/2023  2:12 PM  Sending user: Teetee Silva MD   Payer: [employer]    [phone]          -Phentermine scripts: 1st- 9/25/23, 2nd- 10/25/23, 3rd- 11/24/23.  -Patients BMI is 25.29

## 2024-01-12 DIAGNOSIS — Z30.41 SURVEILLANCE FOR BIRTH CONTROL, ORAL CONTRACEPTIVES: ICD-10-CM

## 2024-01-12 RX ORDER — TIMOLOL MALEATE 5 MG/ML
1 SOLUTION/ DROPS OPHTHALMIC DAILY
Qty: 84 TABLET | Refills: 4 | OUTPATIENT
Start: 2024-01-12

## 2024-01-13 DIAGNOSIS — Z30.41 SURVEILLANCE FOR BIRTH CONTROL, ORAL CONTRACEPTIVES: ICD-10-CM

## 2024-01-15 RX ORDER — TIMOLOL MALEATE 5 MG/ML
1 SOLUTION/ DROPS OPHTHALMIC DAILY
Qty: 84 TABLET | Refills: 0 | Status: SHIPPED | OUTPATIENT
Start: 2024-01-15

## 2024-01-22 ENCOUNTER — OFFICE VISIT (OUTPATIENT)
Dept: FAMILY MEDICINE CLINIC | Facility: CLINIC | Age: 34
End: 2024-01-22
Payer: COMMERCIAL

## 2024-01-22 VITALS
DIASTOLIC BLOOD PRESSURE: 70 MMHG | RESPIRATION RATE: 16 BRPM | BODY MASS INDEX: 23.99 KG/M2 | HEIGHT: 65 IN | TEMPERATURE: 98 F | HEART RATE: 73 BPM | OXYGEN SATURATION: 99 % | WEIGHT: 144 LBS | SYSTOLIC BLOOD PRESSURE: 120 MMHG

## 2024-01-22 DIAGNOSIS — Z00.00 ROUTINE MEDICAL EXAM: Primary | ICD-10-CM

## 2024-01-22 DIAGNOSIS — Z83.49 FAMILY HISTORY OF THYROID DISEASE IN MOTHER: ICD-10-CM

## 2024-01-22 DIAGNOSIS — L97.521 SKIN ULCER OF TOE OF LEFT FOOT, LIMITED TO BREAKDOWN OF SKIN (HCC): ICD-10-CM

## 2024-01-22 DIAGNOSIS — Z00.00 LABORATORY EXAM ORDERED AS PART OF ROUTINE GENERAL MEDICAL EXAMINATION: ICD-10-CM

## 2024-01-22 PROCEDURE — 99395 PREV VISIT EST AGE 18-39: CPT | Performed by: FAMILY MEDICINE

## 2024-01-22 PROCEDURE — 3078F DIAST BP <80 MM HG: CPT | Performed by: FAMILY MEDICINE

## 2024-01-22 PROCEDURE — 3074F SYST BP LT 130 MM HG: CPT | Performed by: FAMILY MEDICINE

## 2024-01-22 PROCEDURE — 3008F BODY MASS INDEX DOCD: CPT | Performed by: FAMILY MEDICINE

## 2024-01-22 NOTE — PROGRESS NOTES
Chief Complaint   Patient presents with    Physical     States she's scheduled for her pap with gyne in March.       HPI:  Cristina Perez is a 33 year old female here today for preventative visit.    Imms- up to date with tdap. Covid & flu discussed.         Cervical cancer screening- No h/o abnormal paps, HPV, or genital warts. Normal pap on 10/26/20 with Dr. Ruiz. Will repeat this year with gyne.        Breast cancer screening- No known family history of breast/ovarian cancer         Colon cancer screening- No early family h/o colon cancer.        Osteoporosis screening- no reason identified for early screening with dexa        Diet/exercise- working on this        Dental/Eye Check up-  Recommended pt see dentist once every 6 months for a cleaning and once every year for an eye exam.     H/o anemia on 20 at 9.5 for hb but normal at 13.8 on 3/8/21.       Blisters of feet- when the cold weather starts. Hurts. Peels. No itching. Informs me her mother has raynaud's, hyper, now hypothyroidism, and was dxed with chilblains lupus, which ar the purple discoloration of the hands/feet when exposed to cold or wet conditions.  Declines meds and referral to rheum/derm. Advised her to let me know if it gets worse.    Past Medical History:   Diagnosis Date    Allergic rhinitis     Anemia     as a child    Blister of skin 2024    chilblain's lupus vs dyshdrotic eczema- purple blister    Lactose intolerance     Periorbital dermatitis 2021    Yesenia-orbital     Raynaud phenomenon      Past Surgical History:   Procedure Laterality Date          OTHER SURGICAL HISTORY      vein surgery     Current Outpatient Medications on File Prior to Visit   Medication Sig Dispense Refill    Levonorgestrel-Ethinyl Estrad (VIENVA) 0.1-20 MG-MCG Oral Tab TAKE 1 TABLET BY MOUTH EVERY DAY 84 tablet 0    hydrocortisone 2.5 % External Cream Apply twice a day to affected area.  Do not use for more than 7-10 days in a row. 20 g  0     No current facility-administered medications on file prior to visit.     Allergies   Allergen Reactions    Seasonal OTHER (SEE COMMENTS)     Runny nose     Social History     Socioeconomic History    Marital status:      Spouse name: Not on file    Number of children: 2    Years of education: Not on file    Highest education level: Not on file   Occupational History    Occupation:      Employer: [employer]   Tobacco Use    Smoking status: Never    Smokeless tobacco: Never   Vaping Use    Vaping Use: Never used   Substance and Sexual Activity    Alcohol use: Not Currently     Comment: wine 1 q 2 mo, never a problem    Drug use: No    Sexual activity: Yes     Partners: Male     Birth control/protection: Pill   Other Topics Concern     Service Not Asked    Blood Transfusions Not Asked    Caffeine Concern No    Occupational Exposure Not Asked    Hobby Hazards Not Asked    Sleep Concern Not Asked    Stress Concern No    Weight Concern Yes    Special Diet No    Back Care Not Asked    Exercise Yes    Bike Helmet Not Asked    Seat Belt Yes    Self-Exams Not Asked   Social History Narrative    Not on file     Social Determinants of Health     Financial Resource Strain: Not on file   Food Insecurity: Not on file   Transportation Needs: Not on file   Physical Activity: Not on file   Stress: Not on file   Social Connections: Not on file   Housing Stability: Not on file     Family History   Problem Relation Age of Onset    Thyroid disease Mother         graves, post radiation hypothyroid    Other (raynaud) Mother     No Known Problems Father     No Known Problems Brother     No Known Problems Brother     Thyroid disease Maternal Grandmother     No Known Problems Maternal Grandfather     No Known Problems Paternal Grandmother     No Known Problems Paternal Grandfather     Colon Cancer Neg     Breast Cancer Neg     Ovarian Cancer Neg        Review of Systems - All systems reviewed and  negative except for HPI    PHYSICAL EXAM:  /70   Pulse 73   Temp 97.9 °F (36.6 °C) (Temporal)   Resp 16   Ht 5' 5\" (1.651 m)   Wt 144 lb (65.3 kg)   LMP 01/15/2024 (Exact Date)   SpO2 99%   BMI 23.96 kg/m²   GENERAL APPEARANCE:  Alert, no acute distress, appears stated age  HEENT:  Head- Normocephalic, atraumatic.    Eyes- Extraocular movements intact, pupils equally round and reactive to light,  conjunctivae normal.    Ears- Tympanic membranes intact bilaterally.    Nose- Patent, normal septum and turbinates.    Mouth/Throat- Normal oral mucosa, throat non-erythematous.  NECK:  No submental, submandibular, ant/post cervical lymphadenopathy. No thyromegaly or masses.  PULMONARY:  Lungs clear to auscultation bilaterally. No wheezes, rales, or rhonchi. Normal respiratory effort.  CARDIOVASCULAR:  Regular rate and rhythm. No murmurs, gallops, or rubs.  ABDOMEN:  + bowel sounds, soft, nontender, nondistended. No hepatomegaly.  MUSCULOSKELETAL: Strength of upper and lower extremities 5/5 bilaterally. Normal gait.  NEUROLOGIC:  Cranial nerves 2-12 grossly intact.  PSYCHIATRIC:  Normal mood, affect, and hygiene.     ASSESSMENT/PLAN:    1. Routine medical exam    2. Laboratory exam ordered as part of routine general medical examination  - CBC [6399] [Q]  - COMP METABOLIC PANEL [23104] [Q]  - LIPID PANEL [7600] [Q]    3. Family history of thyroid disease in mother  - TSH [899] [Q]    4. Skin ulcer of toe of left foot, limited to breakdown of skin (HCC)  -Declines meds and referral to rheum/derm. Advised her to let me know if it gets worse.  -dyshidrotic eczema vs chilblain's lupus      Patient verbalized understanding and agrees to plan.      Return in about 1 year (around 1/22/2025) for annual physical, we will contact you with results.

## 2024-03-07 ENCOUNTER — OFFICE VISIT (OUTPATIENT)
Dept: OBGYN CLINIC | Facility: CLINIC | Age: 34
End: 2024-03-07
Payer: COMMERCIAL

## 2024-03-07 VITALS
HEIGHT: 65 IN | BODY MASS INDEX: 24.66 KG/M2 | DIASTOLIC BLOOD PRESSURE: 82 MMHG | WEIGHT: 148 LBS | HEART RATE: 90 BPM | SYSTOLIC BLOOD PRESSURE: 124 MMHG

## 2024-03-07 DIAGNOSIS — R61 NIGHT SWEAT: ICD-10-CM

## 2024-03-07 DIAGNOSIS — Z12.4 CERVICAL CANCER SCREENING: ICD-10-CM

## 2024-03-07 DIAGNOSIS — Z01.419 WELL WOMAN EXAM WITH ROUTINE GYNECOLOGICAL EXAM: ICD-10-CM

## 2024-03-07 DIAGNOSIS — L65.9 ALOPECIA: Primary | ICD-10-CM

## 2024-03-07 DIAGNOSIS — Z30.41 SURVEILLANCE FOR BIRTH CONTROL, ORAL CONTRACEPTIVES: ICD-10-CM

## 2024-03-07 PROCEDURE — 88175 CYTOPATH C/V AUTO FLUID REDO: CPT | Performed by: NURSE PRACTITIONER

## 2024-03-07 PROCEDURE — 87624 HPV HI-RISK TYP POOLED RSLT: CPT | Performed by: NURSE PRACTITIONER

## 2024-03-07 PROCEDURE — 99395 PREV VISIT EST AGE 18-39: CPT | Performed by: NURSE PRACTITIONER

## 2024-03-07 PROCEDURE — 99213 OFFICE O/P EST LOW 20 MIN: CPT | Performed by: NURSE PRACTITIONER

## 2024-03-07 PROCEDURE — 87625 HPV TYPES 16 & 18 ONLY: CPT | Performed by: NURSE PRACTITIONER

## 2024-03-07 RX ORDER — LEVONORGESTREL AND ETHINYL ESTRADIOL 0.1-0.02MG
1 KIT ORAL DAILY
Qty: 84 TABLET | Refills: 4 | Status: SHIPPED | OUTPATIENT
Start: 2024-03-07

## 2024-03-07 NOTE — PROGRESS NOTES
Here for Routine Annual Exam  Menses are regular  and light lasting a few days.  Contraception- OCP.  She is due for a pap this year    She also has recent concerns for thinning hair, hair loss, and night sweats. She is more concerned with the hair issues. She denies any recent illness, medication changes, significant stressors, or other changes. She used Phentermine from October to December but this is more recent just the last few months. She doesn't think her oral contraceptives have been substituted for a different generic brand.    She does have a family history of early perimenopause/ menopause. Her mom started perimenopause in her mid to late 30's but thinks she still had her menses until her mid to late 40's. She had a cousin stop menstruating around age 38.    ROS: No Cardiac, Respiratory, GI,  or Neurological symptoms.    PE:  GENERAL: well developed, well nourished, in no apparent distress  SKIN: no rashes, no suspicious lesions  HEENT: normal  NECK: supple; no thyroidmegaly, no adenopathy  LUNGS: clear to auscultation  CARDIOVASCULAR: normal S1, S2, RRR  BREASTS: firm, nontendder, no palpable masses or nodes, no nipple discharge, no skin changes, no axillary adenopathy,    ABDOMEN: Soft, non distended; non tender, no masses  GYNE/: External Genitalia: Normal without lesions or erythema                      Vagina: normal without lesions, scant discharge                      Uterus: mid, mobile, non tender, normal size                     Cervix: no lesions or CMT                     Adnexa: non tender, no masses, normal size  EXTREMITIES:  non tender without edema    A/P:  1. Well woman exam with routine gynecological exam  Regular self breast exams recommended    2. Cervical cancer screening  Thin Prep with HPV    3. Alopecia  - FSH  - Estradiol  - Iron And Tibc  - Ferritin  If labs are normal she should consider seeing dermatology    4. Night sweat  - FSH  - Estradiol  - Iron And Tibc  -  Ferritin  If labs are normal we can consider a new oral contraceptive    5. Surveillance for birth control, oral contraceptives  Advised on risks and danger signs for VTE  - Levonorgestrel-Ethinyl Estrad (VIENVA) 0.1-20 MG-MCG Oral Tab; Take 1 tablet by mouth daily.  Dispense: 84 tablet; Refill: 4    She will need to come off the pill for a cycle to check her hormones    An additional 15 minutes was spent discussing patient concerns in addition to her routine annual exam    Return to clinic 1 year for routine exam, or as needed with any concerns or question

## 2024-03-12 LAB
.: NORMAL
.: NORMAL

## 2024-03-13 LAB — HPV I/H RISK 1 DNA SPEC QL NAA+PROBE: POSITIVE

## 2024-03-14 LAB
HPV16 DNA CVX QL PROBE+SIG AMP: NEGATIVE
HPV18 DNA CVX QL PROBE+SIG AMP: NEGATIVE

## 2024-03-15 ENCOUNTER — TELEPHONE (OUTPATIENT)
Dept: OBGYN CLINIC | Facility: CLINIC | Age: 34
End: 2024-03-15

## 2024-04-03 ENCOUNTER — TELEPHONE (OUTPATIENT)
Dept: OBGYN CLINIC | Facility: CLINIC | Age: 34
End: 2024-04-03

## 2024-04-03 NOTE — TELEPHONE ENCOUNTER
Pt called, states she was in back on 03/07 w/ EG for OV. Pt states she was given some instructions for lab work. Pt wants a refresher as she thinks fasting was involved as well as having to be on her cycle. Please call back pt and clarify instructions. Ty.

## 2024-04-03 NOTE — TELEPHONE ENCOUNTER
Patient called back.     Reviewed provider's instructions; all questions answered.   Patient to decide if she wants to restart her OCP or to continue to wait and see if her symptoms are related to side effects.

## 2024-04-03 NOTE — TELEPHONE ENCOUNTER
She just needed to be off her birth control pill for the month. If she does them on day 3 of her cycle she can resume her pill that cycle once she has had the labs completed.

## 2024-04-05 LAB
% SATURATION: 51 % (CALC) (ref 16–45)
ABSOLUTE BASOPHILS: 30 CELLS/UL (ref 0–200)
ABSOLUTE EOSINOPHILS: 50 CELLS/UL (ref 15–500)
ABSOLUTE LYMPHOCYTES: 1405.8 CELLS/UL (ref 850–3900)
ABSOLUTE MONOCYTES: 248 CELLS/UL (ref 200–950)
ABSOLUTE NEUTROPHILS: 1568 CELLS/UL (ref 1500–7800)
ALBUMIN/GLOBULIN RATIO: 1.6 (CALC) (ref 1–2.5)
ALBUMIN: 4.5 G/DL (ref 3.6–5.1)
ALKALINE PHOSPHATASE: 49 U/L (ref 31–125)
ALT: 13 U/L (ref 6–29)
AST: 18 U/L (ref 10–30)
BASOPHILS: 0.9 %
BILIRUBIN, TOTAL: 0.7 MG/DL (ref 0.2–1.2)
BUN: 13 MG/DL (ref 7–25)
CALCIUM: 9.5 MG/DL (ref 8.6–10.2)
CARBON DIOXIDE: 28 MMOL/L (ref 20–32)
CHLORIDE: 105 MMOL/L (ref 98–110)
CHOL/HDLC RATIO: 2.5 (CALC)
CHOLESTEROL, TOTAL: 175 MG/DL
CREATININE: 0.75 MG/DL (ref 0.5–0.97)
EGFR: 108 ML/MIN/1.73M2
EOSINOPHILS: 1.5 %
ESTRADIOL: 37 PG/ML
FERRITIN: 27 NG/ML (ref 16–154)
FSH: 7.6 MIU/ML
GLOBULIN: 2.9 G/DL (CALC) (ref 1.9–3.7)
GLUCOSE: 84 MG/DL (ref 65–99)
HDL CHOLESTEROL: 69 MG/DL
HEMATOCRIT: 40 % (ref 35–45)
HEMOGLOBIN: 13.1 G/DL (ref 11.7–15.5)
IRON BINDING CAPACITY: 340 MCG/DL (CALC) (ref 250–450)
IRON, TOTAL: 174 MCG/DL (ref 40–190)
LDL-CHOLESTEROL: 93 MG/DL (CALC)
LYMPHOCYTES: 42.6 %
MCH: 30.7 PG (ref 27–33)
MCHC: 32.8 G/DL (ref 32–36)
MCV: 93.7 FL (ref 80–100)
MONOCYTES: 7.5 %
MPV: 10.2 FL (ref 7.5–12.5)
NEUTROPHILS: 47.5 %
NON-HDL CHOLESTEROL: 106 MG/DL (CALC)
PLATELET COUNT: 316 THOUSAND/UL (ref 140–400)
POTASSIUM: 4.3 MMOL/L (ref 3.5–5.3)
PROTEIN, TOTAL: 7.4 G/DL (ref 6.1–8.1)
RDW: 12.2 % (ref 11–15)
RED BLOOD CELL COUNT: 4.27 MILLION/UL (ref 3.8–5.1)
SODIUM: 138 MMOL/L (ref 135–146)
TRIGLYCERIDES: 51 MG/DL
TSH: 2.12 MIU/L
WHITE BLOOD CELL COUNT: 3.3 THOUSAND/UL (ref 3.8–10.8)

## 2024-05-03 ENCOUNTER — PATIENT MESSAGE (OUTPATIENT)
Dept: FAMILY MEDICINE CLINIC | Facility: CLINIC | Age: 34
End: 2024-05-03

## 2024-05-03 DIAGNOSIS — E66.3 OVERWEIGHT (BMI 25.0-29.9): Primary | ICD-10-CM

## 2024-05-06 NOTE — TELEPHONE ENCOUNTER
LOV 1/22/2024 for routine physical    Previously on phentermine. OV 11/21/2023  Assessment/Plan:  1. Overweight (BMI 25.0-29.9)  - Phentermine HCl 37.5 MG Oral Tab; Take 1 tablet (37.5 mg total) by mouth every morning before breakfast.  Dispense: 30 tablet; Refill: 0  -pt doing well without side effects  Return in about 1 month (around 12/21/2023) for f/u lifestyle changes.    Ok to restart? Or schedule appointment?

## 2024-05-06 NOTE — TELEPHONE ENCOUNTER
From: Cristina Perez  To: Maci Hui  Sent: 5/3/2024 3:30 PM CDT  Subject: Medication question    Hello Dr. Hui,    I have a question about the medication phentermine you had prescribed for me last year.   If I wanted to try another 1-3 months of it would I have to schedule an appointment to see you?   I wanted to ask before I were to make an appointment if necessary.    Thank you for your time,  Cristina

## 2024-05-07 RX ORDER — PHENTERMINE HYDROCHLORIDE 30 MG/1
30 CAPSULE ORAL EVERY MORNING
Qty: 30 CAPSULE | Refills: 0 | Status: SHIPPED | OUTPATIENT
Start: 2024-05-07

## 2024-06-17 ENCOUNTER — OFFICE VISIT (OUTPATIENT)
Dept: FAMILY MEDICINE CLINIC | Facility: CLINIC | Age: 34
End: 2024-06-17
Payer: COMMERCIAL

## 2024-06-17 VITALS
BODY MASS INDEX: 24.49 KG/M2 | OXYGEN SATURATION: 100 % | DIASTOLIC BLOOD PRESSURE: 70 MMHG | WEIGHT: 147 LBS | HEART RATE: 81 BPM | SYSTOLIC BLOOD PRESSURE: 120 MMHG | RESPIRATION RATE: 16 BRPM | HEIGHT: 65 IN | TEMPERATURE: 98 F

## 2024-06-17 DIAGNOSIS — R53.83 OTHER FATIGUE: ICD-10-CM

## 2024-06-17 DIAGNOSIS — Z72.821 INADEQUATE SLEEP HYGIENE: ICD-10-CM

## 2024-06-17 DIAGNOSIS — E66.3 OVERWEIGHT (BMI 25.0-29.9): Primary | ICD-10-CM

## 2024-06-17 PROCEDURE — 99213 OFFICE O/P EST LOW 20 MIN: CPT | Performed by: FAMILY MEDICINE

## 2024-06-17 RX ORDER — PHENTERMINE HYDROCHLORIDE 30 MG/1
30 CAPSULE ORAL EVERY MORNING
Qty: 30 CAPSULE | Refills: 0 | Status: SHIPPED | OUTPATIENT
Start: 2024-06-17

## 2024-06-17 NOTE — PROGRESS NOTES
[facility] Visit Note  6/17/2024      Subjective:      Patient ID: Cristina Perez is a 33 year old female.    Chief Complaint:  Chief Complaint   Patient presents with    Weight Check     Phentermine 30 mg was restarted on 5/7/24.       HPI:  Cristina Perez is a 33 year old female who is being seen today for the above.      Lifestyle changes-  was on phentermine x 3 mo last yr from sept- Nov and lost 21# from 168# down to 147#. Stopped staying stable at 147#. Asked to restart it on 5/7/24 and did so at the 30mg dose.   Was exercising 4-5x/wk. Then in May gained 4-5# when not exercising. Felt there was no energy b/c was tired. Eating habits are good  Wakes up early at 3AM. Bed at 9PM 5d/wk. Gets 6hr of sleep, then other days. Gets 9hr    TSH and Hb were both normal.   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 Hair loss has slowed down like she was.     Liked that being on phentermine motivated her to exercise and the energy portion.     Goal ~140-145#.      Taking: phentermine 30mg  Started at: 5/22/23, 168#, BMI  Since Last visit: lost 4.5#, 1 months  Total Difference: 19.5#, 5 months  Other changes: clothes fitting looser, looks thinner in the face, exercise is more comfortable     Phentermine scripts: 1st- 9/25/23, 2nd- 10/25/23, 3rd- 11/24/23  Restarted: 1st (30mg)- 5/7/24, 2nd- 6/17/24     Denies- chest pain, palpitations, sob, syncope, difficulty sleeping                  Review of Systems - as stated above in the HPI      Objective:     Vitals:    06/17/24 1102   BP: 120/70   Pulse: 81   Resp: 16   Temp: 98.4 °F (36.9 °C)   TempSrc: Temporal   SpO2: 100%   Weight: 147 lb (66.7 kg)   Height: 5' 5\" (1.651 m)       Physical Examination   General:  Alert, in no acute distress  HEENT: NCAT, EOMI, mucus membranes moist   Neck:  No cervical lymphadenopathy, Please refill her thyroid medication 90d supply with 3 RFs.  CV: Regular rate and rhythm. No murmurs, gallops, or rubs.  Lungs:  Clear to  auscultation B, no wheezes, rales, or rhonchi, normal respiratory effort  Abd:  +bowel sounds, soft  Ext:  No pedal edema,  Pedal pulses 2+ B        Assessment:     1. Overweight (BMI 25.0-29.9)  - Phentermine HCl 30 MG Oral Cap; Take 1 capsule (30 mg total) by mouth every morning.  Dispense: 30 capsule; Refill: 0    2. Other fatigue    3. Inadequate sleep hygiene  -discussed no substitute for sleep. To work on better sleep schedule  -gave her 1 last script for phentermine.      Return for annual physical in Jan.

## 2024-10-15 ENCOUNTER — OFFICE VISIT (OUTPATIENT)
Dept: FAMILY MEDICINE CLINIC | Facility: CLINIC | Age: 34
End: 2024-10-15
Payer: COMMERCIAL

## 2024-10-15 VITALS
DIASTOLIC BLOOD PRESSURE: 70 MMHG | WEIGHT: 159 LBS | HEART RATE: 78 BPM | OXYGEN SATURATION: 99 % | BODY MASS INDEX: 26.49 KG/M2 | HEIGHT: 65 IN | RESPIRATION RATE: 16 BRPM | TEMPERATURE: 98 F | SYSTOLIC BLOOD PRESSURE: 112 MMHG

## 2024-10-15 DIAGNOSIS — R68.89 PROBLEM WITH BODY IMAGE: ICD-10-CM

## 2024-10-15 DIAGNOSIS — E66.3 OVERWEIGHT (BMI 25.0-29.9): Primary | ICD-10-CM

## 2024-10-15 DIAGNOSIS — Z28.21 INFLUENZA VACCINATION DECLINED BY PATIENT: ICD-10-CM

## 2024-10-15 PROCEDURE — 99214 OFFICE O/P EST MOD 30 MIN: CPT | Performed by: FAMILY MEDICINE

## 2024-10-15 RX ORDER — PHENTERMINE HYDROCHLORIDE 30 MG/1
30 CAPSULE ORAL EVERY MORNING
Qty: 30 CAPSULE | Refills: 0 | Status: CANCELLED | OUTPATIENT
Start: 2024-10-15

## 2024-10-15 RX ORDER — TOPIRAMATE 25 MG/1
25 TABLET, FILM COATED ORAL 2 TIMES DAILY
Qty: 60 TABLET | Refills: 0 | Status: SHIPPED | OUTPATIENT
Start: 2024-10-15

## 2024-10-15 NOTE — PROGRESS NOTES
[facility] Visit Note  10/15/2024      Subjective:      Patient ID: Cristina Perez is a 34 year old female.    Chief Complaint:  Chief Complaint   Patient presents with    Weight Problem     States she's struggling with her weight. Declined the influenza vaccine today        HPI:  Cristina Perez is a 34 year old female who is being seen today for the above.      Weight struggle-  Wt got down to 144# on 1/22/24 with BMI of 23.96 and has creaped up to 159#/26.46 BMI on 10/15/24. Gained weight back after her June visit, which is when she stopped the phentermine. Would like to take something that is not addicting and more long-term.    Bought a treadmill 4-5d/wk 45-60min each time. Unsure if it's her eating habits or why she is gaining weight. Doesn't like the way she looks so doesn't want to go out just to go to the store etc. Feels she is an adult and shouldn't be struggling with this still.  Would appreciate to talk to someone about it.    Food diary- 3 proteins, 2 carbs, 1 veggie, no fruit, 1 dairy    Breakfast- 2 eggs, 1 slice toast with butter.  Late lunch- chick-julio-A 8 grilled nuggets, side kale salad with honey roasted BBQ sauce, diet coke,   Snack- 2 pieces of mozarella cheese  Breakfast- protein bar, 8 oz coffee         History 6/24:  was on phentermine x 3 mo last yr from sept- Nov and lost 21# from 168# down to 147#. Stopped staying stable at 147#. Asked to restart it on 5/7/24 and did so at the 30mg dose.   Was exercising 4-5x/wk. Then in May gained 4-5# when not exercising. Felt there was no energy b/c was tired. Eating habits are good  Wakes up early at 3AM. Bed at 9PM 5d/wk. Gets 6hr of sleep, then other days. Gets 9hr     TSH and Hb were both normal.   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 Hair loss has slowed down like she was.      Liked that being on phentermine motivated her to exercise and the energy portion.     Goal ~140-145#.      Taking: phentermine 30mg  Started at:  5/22/23, 168#, BMI  Since Last visit: lost 4.5#, 1 months  Total Difference: 19.5#, 5 months  Other changes: clothes fitting looser, looks thinner in the face, exercise is more comfortable     Phentermine scripts: 1st- 9/25/23, 2nd- 10/25/23, 3rd- 11/24/23  Restarted: 1st (30mg)- 5/7/24, 2nd- 6/17/24      Review of Systems - as stated above in the HPI      Objective:     Vitals:    10/15/24 1304   BP: 112/70   Pulse: 78   Resp: 16   Temp: 98 °F (36.7 °C)   TempSrc: Temporal   SpO2: 99%   Weight: 159 lb (72.1 kg)   Height: 5' 5\" (1.651 m)       Physical Examination   General:  Alert, in no acute distress  HEENT: NCAT, EOMI, mucus membranes moist   Neck:  No cervical lymphadenopathy  CV: Regular rate and rhythm. No murmurs, gallops, or rubs.  Lungs:  Clear to auscultation B, no wheezes, rales, or rhonchi, normal respiratory effort  Abd:  +bowel sounds, soft  Ext:  No pedal edema,  Pedal pulses 2+ B        Assessment:     1. Overweight (BMI 25.0-29.9)  - topiramate 25 MG Oral Tab; Take 1 tablet (25 mg total) by mouth 2 (two) times daily.  Dispense: 60 tablet; Refill: 0  - Refer to Nutritionist/Dietician - EDW    2. Problem with body image  - [facility] Referral - In Network    3. Influenza vaccination declined by patient  - Fluzone trivalent vaccine, PF 0.5mL, 6mo+ (66099)        Return in about 1 month (around 11/15/2024) for f/u lifestlye changes.

## 2024-11-15 ENCOUNTER — OFFICE VISIT (OUTPATIENT)
Dept: FAMILY MEDICINE CLINIC | Facility: CLINIC | Age: 34
End: 2024-11-15
Payer: COMMERCIAL

## 2024-11-15 VITALS
BODY MASS INDEX: 26.99 KG/M2 | TEMPERATURE: 98 F | SYSTOLIC BLOOD PRESSURE: 110 MMHG | RESPIRATION RATE: 16 BRPM | DIASTOLIC BLOOD PRESSURE: 70 MMHG | OXYGEN SATURATION: 99 % | HEIGHT: 65 IN | WEIGHT: 162 LBS | HEART RATE: 63 BPM

## 2024-11-15 DIAGNOSIS — L65.8 FEMALE PATTERN HAIR LOSS: ICD-10-CM

## 2024-11-15 DIAGNOSIS — E66.3 OVERWEIGHT (BMI 25.0-29.9): Primary | ICD-10-CM

## 2024-11-15 PROCEDURE — 99213 OFFICE O/P EST LOW 20 MIN: CPT | Performed by: FAMILY MEDICINE

## 2024-11-15 NOTE — PROGRESS NOTES
[facility] Visit Note  11/15/2024      Subjective:      Patient ID: Cristina Perez is a 34 year old female.    Chief Complaint:  Chief Complaint   Patient presents with    Weight Check     States she only took the Topiramate for 2 weeks then stopped because it made her feel depressed which she states is one of the listed rare side effects.       HPI:  Cristina Perez is a 34 year old female who is being seen today for the above.        Hair loss- saw Dr. Arcos and she suggested Women's Rogaine. No other reason found      Weight struggle-  started topiramate for a couple weeks and felt it made her depressed (sad, emotional, crying easily). Up 3# in 1mo. Stopped taking it and was fine. Hasn't made an appt with counselor yet, didn't return Leela's call. Says it's hard to address the emotional portion. Told her I would not pursue other meds today, but work on her mental health.      History 10/15/24: Wt got down to 144# on 1/22/24 with BMI of 23.96 and has creaped up to 159#/26.46 BMI on 10/15/24. Gained weight back after her June visit, which is when she stopped the phentermine. Would like to take something that is not addicting and more long-term.    Bought a treadmill 4-5d/wk 45-60min each time. Unsure if it's her eating habits or why she is gaining weight. Doesn't like the way she looks so doesn't want to go out just to go to the store etc. Feels she is an adult and shouldn't be struggling with this still.  Would appreciate to talk to someone about it.     Food diary- 3 proteins, 2 carbs, 1 veggie, no fruit, 1 dairy     Breakfast- 2 eggs, 1 slice toast with butter.  Late lunch- chick-julio-A 8 grilled nuggets, side kale salad with honey roasted BBQ sauce, diet coke,   Snack- 2 pieces of mozarella cheese  Breakfast- protein bar, 8 oz coffee           History 6/24:  was on phentermine x 3 mo last yr from sept- Nov and lost 21# from 168# down to 147#. Stopped staying stable at 147#. Asked to restart it on  5/7/24 and did so at the 30mg dose.   Was exercising 4-5x/wk. Then in May gained 4-5# when not exercising. Felt there was no energy b/c was tired. Eating habits are good  Wakes up early at 3AM. Bed at 9PM 5d/wk. Gets 6hr of sleep, then other days. Gets 9hr     TSH and Hb were both normal.   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 Hair loss has slowed down like she was.      Liked that being on phentermine motivated her to exercise and the energy portion.     Goal ~140-145#.      Taking: phentermine 30mg  Started at: 5/22/23, 168#, BMI  Since Last visit: lost 4.5#, 1 months  Total Difference: 19.5#, 5 months  Other changes: clothes fitting looser, looks thinner in the face, exercise is more comfortable     Phentermine scripts: 1st- 9/25/23, 2nd- 10/25/23, 3rd- 11/24/23  Restarted: 1st (30mg)- 5/7/24, 2nd- 6/17/24           Review of Systems - as stated above in the HPI      Objective:     Vitals:    11/15/24 1303   BP: 110/70   Pulse: 63   Resp: 16   Temp: 97.9 °F (36.6 °C)   TempSrc: Temporal   SpO2: 99%   Weight: 162 lb (73.5 kg)   Height: 5' 5\" (1.651 m)       Physical Examination   General:  Alert, in no acute distress  HEENT: NCAT, EOMI, mucus membranes moist   Neck:  No cervical lymphadenopathy  CV: Regular rate and rhythm. No murmurs, gallops, or rubs.  Lungs:  Clear to auscultation B, no wheezes, rales, or rhonchi, normal respiratory effort  Abd:  +bowel sounds, soft  Ext:  No pedal edema,  Pedal pulses 2+ B  Psych: contemplative mood, normal affect and hygiene           Assessment:     1. Overweight (BMI 25.0-29.9)  -gave her [facility] card to see about counselors to work on mental health and eating issues.    2. Female pattern hair loss  -discussed women's rogaine      Return for annual physical in Jan/Feb.

## 2024-12-02 ENCOUNTER — OFFICE VISIT (OUTPATIENT)
Dept: NUTRITION | Age: 34
End: 2024-12-02
Attending: FAMILY MEDICINE
Payer: COMMERCIAL

## 2024-12-02 PROCEDURE — 97802 MEDICAL NUTRITION INDIV IN: CPT

## 2024-12-02 NOTE — PROGRESS NOTES
ADULT INITIAL OUTPATIENT NUTRITION CONSULTATION    Nutrition Assessment    Medical Diagnosis: Overweight, hair thinning    PMH: noted    Client Hx: 34 year old female    Meds: MVI  Phentermine and topomax in past    Labs: WNL, plans to have blood draw for iron, Vit.D, and thyroid testing in future    ANTHROPOMETRICS:  Ht: 5'5\"  Wt: 155-160# (at home per pt)    BMI: 26.6  AdjBW: 140#    Current Diet: Regular    Food/Beverage Intake:   BREAKFAST: eggs, sprouted bread, PB OR yogurt w banana  LUNCH: 1/2 sandwich or skips  DINNER: salmon or fish (from [place]), rice OR pasta, salad OR ground turkey and veggies, OR chicken meal  SNACKS: crackers, fruits  BEVERAGES: coffee w cream 2c/d, flavored water, rarely diet coke, no juice    Meal pattern: 2 meals/d, 1-2 snacks/d    Number of meals eaten at restaurants: 1-2x/month    Alcohol Intake: very rarely    Estimated nutrition needs: 1275-6043 cals/d, 80 gm protein, <24 gm added sugars    Physical Activity:  walking  GOAL: treadmill and weights 3-5x/wk, at home, 150+ min/wk    Sleep: 6-8 hrs/d    Stress/anxiety: moderate-low  Coping mechanisms reviewed and encouraged    Nutrition Diagnosis: Food and Nutrition related knowledge deficit related to lack of previous diet education by RD as evidence by pt need for education regarding weight loss management and recent hair thinning.    Nutrition Intervention/Education: Comprehensive nutrition education and evaluation provided for weight loss management and recent hair thinning. Pt reports feeling frustrated with recent hair thinning. Pt has seen derm who recommended rogaine-which pt is holding off taking for now. No abnormal lab work indicated, discussed the importance of variety of foods/colors, including vit. A/C/D, protein, omega 3 rich foods and including magnesium and biotin. Encouraged lean meats, whole grains, low fat dairy, f&v, and adding more nuts and seeds. Encouraged small frequent meals to obtain proper nutrients and  prevent overeating. Written materials were issued and explained, all questions answered today. Pt has set goals to follow recommendations set today, to track intake using phone chandra, MFP, and to contact RD with further questions or concerns. Suggest 10% loss of body weight in 3-6 months (~16# by May, ~146#)         Monitoring/Evaluation:  Pt to follow with MD for labs, weight, meds.   RD available prn, suggest follow in 3-6 months.     Time spent with patient: 60 minutes    Thank you for the referral,    Shana Maria RD, LDN  [email or web address]  P: [phone]

## 2024-12-05 ENCOUNTER — OFFICE VISIT (OUTPATIENT)
Dept: OBGYN CLINIC | Facility: CLINIC | Age: 34
End: 2024-12-05
Payer: COMMERCIAL

## 2024-12-05 VITALS
WEIGHT: 164.19 LBS | HEART RATE: 86 BPM | SYSTOLIC BLOOD PRESSURE: 114 MMHG | BODY MASS INDEX: 27 KG/M2 | DIASTOLIC BLOOD PRESSURE: 68 MMHG

## 2024-12-05 DIAGNOSIS — L65.9 ALOPECIA: Primary | ICD-10-CM

## 2024-12-05 PROCEDURE — 99213 OFFICE O/P EST LOW 20 MIN: CPT | Performed by: NURSE PRACTITIONER

## 2024-12-05 RX ORDER — DROSPIRENONE AND ETHINYL ESTRADIOL 0.02-3(28)
1 KIT ORAL DAILY
Qty: 84 TABLET | Refills: 1 | Status: SHIPPED | OUTPATIENT
Start: 2024-12-05 | End: 2025-12-05

## 2024-12-05 NOTE — PROGRESS NOTES
Veronica note       S: patient is a 34 year old yo  here as she is still struggling with hair loss. She has seen her dermatologist who said she could start Rogaine but she wasn't comfortable with this.    Her labs so far have all been normal but she wanted to be sure she didn't have any vitamin deficiency and also that she thought she may have been on her biotin when she checked her thyroid    Review of Systems:  General: denies fevers, chills, fatigue and malaise.       O:/68   Pulse 86   Wt 164 lb 3.2 oz (74.5 kg)   LMP 2024 (Approximate)   BMI 27.32 kg/m²   Gen NAD            A/P:  1. Alopecia  No biotin x 3 days    - TSH W REFLEX TO FREE T4 [46313][Q]  - VITAMIN D, 25-HYDROXY [11283][Q]  - Drospirenone-Ethinyl Estradiol (BONI) 3-0.02 MG Oral Tab; Take 1 tablet by mouth daily.  Dispense: 84 tablet; Refill: 1    Discussed hair specialist with her

## 2024-12-07 ENCOUNTER — PATIENT MESSAGE (OUTPATIENT)
Dept: OBGYN CLINIC | Facility: CLINIC | Age: 34
End: 2024-12-07

## 2024-12-09 ENCOUNTER — TELEPHONE (OUTPATIENT)
Dept: OBGYN CLINIC | Facility: CLINIC | Age: 34
End: 2024-12-09

## 2024-12-09 DIAGNOSIS — L65.9 ALOPECIA: Primary | ICD-10-CM

## 2024-12-11 LAB
% SATURATION: 15 % (CALC) (ref 16–45)
FERRITIN: 30 NG/ML (ref 16–154)
IRON BINDING CAPACITY: 339 MCG/DL (CALC) (ref 250–450)
IRON, TOTAL: 50 MCG/DL (ref 40–190)
TSH W/REFLEX TO FT4: 2.17 MIU/L
VITAMIN D, 25-OH, TOTAL: 30 NG/ML (ref 30–100)

## 2024-12-12 ENCOUNTER — PATIENT MESSAGE (OUTPATIENT)
Dept: OBGYN CLINIC | Facility: CLINIC | Age: 34
End: 2024-12-12

## 2025-03-20 ENCOUNTER — TELEPHONE (OUTPATIENT)
Dept: OBGYN CLINIC | Facility: CLINIC | Age: 35
End: 2025-03-20

## 2025-03-20 NOTE — TELEPHONE ENCOUNTER
Attempted to reach patient by phone but no answer.  Jack Erwin message sent.  -Office visit recommended

## 2025-03-20 NOTE — TELEPHONE ENCOUNTER
Pt is having pelvic pain nausea and cramping from few days, she just had her period on 03/09/2023.   She thinks she is not pregnant, please give her call back with suggestion for this

## 2025-03-24 ENCOUNTER — OFFICE VISIT (OUTPATIENT)
Dept: OBGYN CLINIC | Facility: CLINIC | Age: 35
End: 2025-03-24
Payer: COMMERCIAL

## 2025-03-24 VITALS — DIASTOLIC BLOOD PRESSURE: 84 MMHG | WEIGHT: 168 LBS | SYSTOLIC BLOOD PRESSURE: 124 MMHG | BODY MASS INDEX: 28 KG/M2

## 2025-03-24 DIAGNOSIS — N93.0 PCB (POST COITAL BLEEDING): Primary | ICD-10-CM

## 2025-03-24 DIAGNOSIS — R10.2 PELVIC PAIN: ICD-10-CM

## 2025-03-24 PROCEDURE — 87625 HPV TYPES 16 & 18 ONLY: CPT | Performed by: NURSE PRACTITIONER

## 2025-03-24 PROCEDURE — 87491 CHLMYD TRACH DNA AMP PROBE: CPT | Performed by: NURSE PRACTITIONER

## 2025-03-24 PROCEDURE — 87591 N.GONORRHOEAE DNA AMP PROB: CPT | Performed by: NURSE PRACTITIONER

## 2025-03-24 PROCEDURE — 81514 NFCT DS BV&VAGINITIS DNA ALG: CPT | Performed by: NURSE PRACTITIONER

## 2025-03-24 PROCEDURE — 87624 HPV HI-RISK TYP POOLED RSLT: CPT | Performed by: NURSE PRACTITIONER

## 2025-03-24 PROCEDURE — 88175 CYTOPATH C/V AUTO FLUID REDO: CPT | Performed by: NURSE PRACTITIONER

## 2025-03-24 RX ORDER — FLUOCINOLONE ACETONIDE 0.11 MG/ML
OIL TOPICAL
COMMUNITY
Start: 2024-12-12

## 2025-03-24 RX ORDER — KETOCONAZOLE 20 MG/ML
1 SHAMPOO, SUSPENSION TOPICAL EVERY OTHER DAY
COMMUNITY
Start: 2025-01-23

## 2025-03-24 RX ORDER — FLUOCINONIDE TOPICAL SOLUTION USP, 0.05% 0.5 MG/ML
SOLUTION TOPICAL
COMMUNITY
Start: 2024-11-12

## 2025-03-24 RX ORDER — CICLOPIROX 1 G/100ML
SHAMPOO TOPICAL
COMMUNITY
Start: 2025-01-24

## 2025-03-24 NOTE — PROGRESS NOTES
Gyne note       S: patient is a 34 year old yo  here for a recent episode of pain and bleeding with intercourse.     She had her normal menses 3/8/2025. She has not been on the birth control pill. She had intercourse last week. She immediately started bleeding with intercourse. There was also clots.It was day 11 of her cycle.She had spotting a few days afterward, and continues to have mild pelvic discomfort. She also had dull pain not cramping during the spotting.    Review of Systems:  General: denies fevers, chills, fatigue and malaise.     O:/84   Wt 168 lb (76.2 kg)   LMP 2025 (Approximate)   Breastfeeding No   BMI 27.96 kg/m²   Gen NAD     GYNE/: External Genitalia: Normal appearing, no lesions. Urethral meatus appear wnl, no abnormal discharge or lesions noted.                                Vagina: normal pink mucosa, no lesions, normal clear discharge.                      Uterus: AV, mobile, non tender, normal size                     Cervix: parous, no lesions                     Adnexa: non tender, no masses, normal size             A/P:  1. PCB (post coital bleeding)  - US PELVIS W EV (CPT=76856/66533); Future  - ThinPrep Pap with HPV Reflex, Chlamydia/GC; Future  - Chlamydia/Gc Amplification  - Vaginitis Vaginosis PCR Panel; Future    2. Pelvic pain  - US PELVIS W EV (CPT=76856/16057); Future  - ThinPrep Pap with HPV Reflex, Chlamydia/GC; Future  - Chlamydia/Gc Amplification

## 2025-03-25 LAB
BV BACTERIA DNA VAG QL NAA+PROBE: NEGATIVE
C GLABRATA DNA VAG QL NAA+PROBE: NEGATIVE
C KRUSEI DNA VAG QL NAA+PROBE: NEGATIVE
C TRACH DNA SPEC QL NAA+PROBE: NEGATIVE
CANDIDA DNA VAG QL NAA+PROBE: NEGATIVE
N GONORRHOEA DNA SPEC QL NAA+PROBE: NEGATIVE
T VAGINALIS DNA VAG QL NAA+PROBE: NEGATIVE

## 2025-03-27 LAB
HPV E6+E7 MRNA CVX QL NAA+PROBE: POSITIVE
HPV16 DNA CVX QL PROBE+SIG AMP: NEGATIVE
HPV18 DNA CVX QL PROBE+SIG AMP: NEGATIVE

## 2025-03-28 PROBLEM — R87.610 ASCUS WITH POSITIVE HIGH RISK HPV CERVICAL: Status: ACTIVE | Noted: 2025-03-28

## 2025-03-28 PROBLEM — R87.810 ASCUS WITH POSITIVE HIGH RISK HPV CERVICAL: Status: ACTIVE | Noted: 2025-03-28

## 2025-05-08 ENCOUNTER — OFFICE VISIT (OUTPATIENT)
Dept: FAMILY MEDICINE CLINIC | Facility: CLINIC | Age: 35
End: 2025-05-08
Payer: COMMERCIAL

## 2025-05-08 ENCOUNTER — TELEPHONE (OUTPATIENT)
Dept: FAMILY MEDICINE CLINIC | Facility: CLINIC | Age: 35
End: 2025-05-08

## 2025-05-08 VITALS
OXYGEN SATURATION: 99 % | SYSTOLIC BLOOD PRESSURE: 112 MMHG | WEIGHT: 173 LBS | TEMPERATURE: 98 F | RESPIRATION RATE: 16 BRPM | HEART RATE: 60 BPM | HEIGHT: 65 IN | BODY MASS INDEX: 28.82 KG/M2 | DIASTOLIC BLOOD PRESSURE: 72 MMHG

## 2025-05-08 DIAGNOSIS — Z00.00 LABORATORY EXAM ORDERED AS PART OF ROUTINE GENERAL MEDICAL EXAMINATION: ICD-10-CM

## 2025-05-08 DIAGNOSIS — E66.3 OVERWEIGHT (BMI 25.0-29.9): ICD-10-CM

## 2025-05-08 DIAGNOSIS — Z00.00 ROUTINE MEDICAL EXAM: Primary | ICD-10-CM

## 2025-05-08 PROCEDURE — 99214 OFFICE O/P EST MOD 30 MIN: CPT | Performed by: FAMILY MEDICINE

## 2025-05-08 PROCEDURE — 99395 PREV VISIT EST AGE 18-39: CPT | Performed by: FAMILY MEDICINE

## 2025-05-08 RX ORDER — PHENTERMINE HYDROCHLORIDE 30 MG/1
30 CAPSULE ORAL EVERY MORNING
Qty: 30 CAPSULE | Refills: 0 | Status: SHIPPED | OUTPATIENT
Start: 2025-05-08

## 2025-05-08 NOTE — TELEPHONE ENCOUNTER
PA submitted along with today's office visit notes via [employer] for the Phentermine HCl 30 MG Oral Cap, awaiting a response.      Waiting for Payer Response   5/8/2025  2:24 PM  Deadline to reply: May 14, 2025  6:30 AM User: Arlen Belcher MA   Note from payer: Please answer the provided questions and return this form when complete. (Message [phone])   Note to payer: See attached chart notes.   Payer: [employer] Case ID: [id number]    [phone]    [phone]  Attachment:  Document: ePA Attachment Phentermine HCl 30 MG Oral Cap 5/8/2025- 2:24 PM  Antiobesity Agents [facility] STD 10/2024 PA **BRANDI WEB** ePA Review (DOC)   Antiobesity Agents [facility] STD 10/2024 PA **BRANDI WEB** ePA Review (DOC)  Question Answer Last Update   Has the patient received 3 months of therapy with the requested drug within the past 365 days? NO    Will the requested drug be used with a reduced-calorie diet AND increased physical activity in the management of exogenous obesity? YES    Has the patient participated in a comprehensive weight management program that encourages behavioral modification, reduced-calorie diet, AND increased physical activity with continuing follow-up for at least 6 months prior to using drug therapy? YES    Does the patient have a baseline body mass index (BMI) of less than 27 kg/m2? [NOTE: If the patient is transitioning from another drug therapy for weight loss, please consider their baseline BMI at the start of any drug therapy when answering this question.] NO    Does the patient have a baseline body mass index (BMI) of 27 kg/m2 to less than 30 kg/m2? ACTION REQUIRED: If yes, then prescriber MUST submit chart notes that show the patient's baseline BMI. [NOTE: If the patient is transitioning from another drug therapy for weight loss, please consider their baseline BMI at the start of any drug therapy when answering this question.] YES (Patient's BMI is 28.79 kg/m2. see attached chart notes.) An   Have chart notes  showing the patient's baseline body mass index (BMI) been submitted to [employer]? ACTION REQUIRED: Submit supporting documentation. [NOTE: If the patient is transitioning from another drug therapy for weight loss, please provide their baseline BMI at the start of any drug therapy.] YES (See attached chart notes.)    Does the patient have at least ONE weight-related comorbid condition (e.g., hypertension, type 2 diabetes mellitus, dyslipidemia)? ACTION REQUIRED: If yes, then prescriber MUST submit chart notes that indicate the patient's weight-related comorbid condition(s).  [NOTE: If the patient is transitioning from another drug therapy for weight loss, please consider their weight-related comorbid condition(s) at the start of any drug therapy when answering this question.] NO (No) Answered by Arlen Belcher MA at 5/8/2025  2:23 PM

## 2025-05-08 NOTE — TELEPHONE ENCOUNTER
Phentermine HCl 30 MG Oral Cap     History    View all authorizations for this medication  Denied   5/8/2025  2:58 PM  Appeal supported: No Appeal instructions: Your PA request has been denied.  Additional information will be provided in the denial communication. (Message 1140)   Note from payer: Your PA request has been denied.  Additional information will be provided in the denial communication. (Message 1140)   Payer: [facility] Case ID: [id number]    [phone]    [phone]  Waiting for Payer Response   5/8/2025  2:24 PM          [facility] pharmacy notified of the above denial.

## 2025-05-08 NOTE — PROGRESS NOTES
Chief Complaint   Patient presents with    Physical       HPI:  Cristina Perez is a 34 year old female here today for preventative visit.     Imms- up to date with tdap. Discussed covid         Cervical cancer screening- + ASCUS with high risk HPV on 3/24/25 with Angela Florence. Has a colposcopy scheduled for the end of the year. Normal pap previously on 10/26/20 with Dr. Ruiz.         Breast cancer screening- No known family history of breast/ovarian cancer         Colon cancer screening- No early family h/o colon cancer.        Osteoporosis screening- no reason identified for early screening with dexa        Diet/exercise- working on this        Dental/Eye Check up-  Recommended pt see dentist once every 6 months for a cleaning and once every year for an eye exam.     H/o anemia on 2/26/20 at 9.5 for hb but normal at 13.8 on 3/8/21.        Blisters of feet- when the cold weather starts. Hurts. Peels. No itching. Informs me her mother has raynaud's, hyper, now hypothyroidism, and was dxed with chilblains lupus, which ar the purple discoloration of the hands/feet when exposed to cold or wet conditions.  Declines meds and referral to rheum/derm. Advised her to let me know if it gets worse.      Is on her feet all day at work  >10,000 steps daily.   Exercises 3-4d/wk 30-60min/d, does incline walking on a treadmill. or  High intensity work-out: weight, burpees, etc. Running hurts the knees   Saw nutritionist at the end of 2024- advised watching how much she is eating and started doing the Loose it chandra. To maintain caloric intake was  Was advised to loose 1-1.5#/wk with a 1364 mery/d    Was eating 1800 mery + phentermine over 1 yr ago and was losing then.Not sure why eating less and not on phentermine and gained 10# in the last 6mo      Past Medical History[1]  Past Surgical History[2]  Medications Ordered Prior to Encounter[3]  Allergies[4]  Social History     Socioeconomic History    Marital status:      Spouse  name: Not on file    Number of children: 2    Years of education: Not on file    Highest education level: Not on file   Occupational History    Occupation:      Employer: [employer]   Tobacco Use    Smoking status: Never    Smokeless tobacco: Never   Vaping Use    Vaping status: Never Used   Substance and Sexual Activity    Alcohol use: Not Currently     Comment: socially maybe 2x/yr, never a problem    Drug use: No    Sexual activity: Yes     Partners: Male     Birth control/protection: Condom   Other Topics Concern     Service Not Asked    Blood Transfusions Not Asked    Caffeine Concern Yes    Occupational Exposure Not Asked    Hobby Hazards Not Asked    Sleep Concern Not Asked    Stress Concern No    Weight Concern Yes    Special Diet No    Back Care Not Asked    Exercise Yes    Bike Helmet Not Asked    Seat Belt Yes    Self-Exams Not Asked   Social History Narrative    Not on file     Social Drivers of Health     Food Insecurity: No Food Insecurity (5/8/2025)    NCSS - Food Insecurity     Worried About Running Out of Food in the Last Year: No     Ran Out of Food in the Last Year: No   Transportation Needs: No Transportation Needs (5/8/2025)    NCSS - Transportation     Lack of Transportation: No   Stress: Not on file   Housing Stability: Not At Risk (5/8/2025)    NCSS - Housing/Utilities     Has Housing: Yes     Worried About Losing Housing: No     Unable to Get Utilities: No     Family History[5]    Review of Systems - All systems reviewed and negative except for HPI    PHYSICAL EXAM:  /72   Pulse 60   Temp 98 °F (36.7 °C) (Temporal)   Resp 16   Ht 5' 5\" (1.651 m)   Wt 173 lb (78.5 kg)   LMP 03/08/2025 (Approximate)   SpO2 99%   BMI 28.79 kg/m²   GENERAL APPEARANCE:  Alert, no acute distress, appears stated age  HEENT:  Head- Normocephalic, atraumatic.    Eyes- Extraocular movements intact, pupils equally round and reactive to light,  conjunctivae normal.    Ears-  Tympanic membranes intact bilaterally.    Nose- Patent, normal septum and turbinates.    Mouth/Throat- Normal oral mucosa, throat non-erythematous.  NECK:  No submental, submandibular, ant/post cervical lymphadenopathy. No thyromegaly or masses.  PULMONARY:  Lungs clear to auscultation bilaterally. No wheezes, rales, or rhonchi. Normal respiratory effort.  CARDIOVASCULAR:  Regular rate and rhythm. No murmurs, gallops, or rubs.  ABDOMEN:  + bowel sounds, soft, nontender, nondistended. No hepatomegaly.  MUSCULOSKELETAL: Strength of upper and lower extremities 5/5 bilaterally. Normal gait.  NEUROLOGIC:  Cranial nerves 2-12 grossly intact.  PSYCHIATRIC:  Normal mood, affect, and hygiene.     ASSESSMENT/PLAN:    1. Routine medical exam    2. Laboratory exam ordered as part of routine general medical examination  - CBC [6399] [Q]  - COMP METABOLIC PANEL [34246] [Q]  - LIPID PANEL [7600] [Q]    3. Overweight (BMI 25.0-29.9)  - [facility] [place] [address] [place]  - Phentermine HCl 30 MG Oral Cap; Take 1 capsule (30 mg total) by mouth every morning.  Dispense: 30 capsule; Refill: 0  -advised her to bring her chandra and can discuss diet at next visit      Patient verbalized understanding and agrees to plan.      Return in about 1 year (around 2026) for annual physical, we will contact you with results.         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 LATHER FOR 5 MINUTES, THEN RINSE      Ciclopirox 1 % External Shampoo WASH SCALP EVERY OTHER DAY, LEAVE ON 5 MIN, THEN RINSE.      hydrocortisone 2.5 % External Cream Apply twice a day to affected area.  Do not use for more than 7-10 days in a row. 20 g 0     No current facility-administered medications on file prior to visit.   [4]   Allergies  Allergen Reactions    Seasonal OTHER (SEE COMMENTS)     Runny nose   [5]   Family History  Problem Relation Age of Onset    Thyroid disease Mother         graves, post radiation hypothyroid    Other (raynaud) Mother     Depression Mother     No Known Problems Father     No Known Problems Brother     No Known Problems Brother     Thyroid disease Maternal Grandmother     No Known Problems Maternal Grandfather     No Known Problems Paternal Grandmother     No Known Problems Paternal Grandfather     Colon Cancer Neg     Breast Cancer Neg     Ovarian Cancer Neg

## 2025-05-15 ENCOUNTER — PATIENT MESSAGE (OUTPATIENT)
Dept: OBGYN CLINIC | Facility: CLINIC | Age: 35
End: 2025-05-15

## 2025-05-15 DIAGNOSIS — L65.9 ALOPECIA: Primary | ICD-10-CM

## 2025-05-20 NOTE — TELEPHONE ENCOUNTER
I can order them however I don't typically order Cortisol so please have her instruct you on when and how to complete them. Also since you are doing these for her I would expect she would be managing any abnormalities.    I ordered the DHEA and Cortisol. I know that with [facility] when we order the Testosterone it comes up as male only. Can you reach out to question and get a lab code we need for females to order specifically before the patient proceed with the labs?

## 2025-05-21 ENCOUNTER — OFFICE VISIT (OUTPATIENT)
Dept: OBGYN CLINIC | Facility: CLINIC | Age: 35
End: 2025-05-21
Payer: COMMERCIAL

## 2025-05-21 VITALS
HEIGHT: 65 IN | DIASTOLIC BLOOD PRESSURE: 76 MMHG | BODY MASS INDEX: 27.7 KG/M2 | SYSTOLIC BLOOD PRESSURE: 110 MMHG | HEART RATE: 91 BPM | WEIGHT: 166.25 LBS

## 2025-05-21 DIAGNOSIS — R87.810 CERVICAL HIGH RISK HUMAN PAPILLOMAVIRUS (HPV) DNA TEST POSITIVE: ICD-10-CM

## 2025-05-21 DIAGNOSIS — R87.610 PAPANICOLAOU SMEAR OF CERVIX WITH ATYPICAL SQUAMOUS CELLS OF UNDETERMINED SIGNIFICANCE (ASC-US): ICD-10-CM

## 2025-05-21 DIAGNOSIS — Z01.812 PRE-PROCEDURAL LABORATORY EXAMINATION: Primary | ICD-10-CM

## 2025-05-21 LAB
CONTROL LINE PRESENT WITH A CLEAR BACKGROUND (YES/NO): YES YES/NO
KIT LOT #: NORMAL NUMERIC
PREGNANCY TEST, URINE: NEGATIVE

## 2025-05-21 PROCEDURE — 81025 URINE PREGNANCY TEST: CPT | Performed by: STUDENT IN AN ORGANIZED HEALTH CARE EDUCATION/TRAINING PROGRAM

## 2025-05-21 PROCEDURE — 88342 IMHCHEM/IMCYTCHM 1ST ANTB: CPT | Performed by: STUDENT IN AN ORGANIZED HEALTH CARE EDUCATION/TRAINING PROGRAM

## 2025-05-21 PROCEDURE — 57454 BX/CURETT OF CERVIX W/SCOPE: CPT | Performed by: STUDENT IN AN ORGANIZED HEALTH CARE EDUCATION/TRAINING PROGRAM

## 2025-05-21 PROCEDURE — 88305 TISSUE EXAM BY PATHOLOGIST: CPT | Performed by: STUDENT IN AN ORGANIZED HEALTH CARE EDUCATION/TRAINING PROGRAM

## 2025-05-21 NOTE — PATIENT INSTRUCTIONS
Colposcopy  Colposcopy is a procedure that gives your health care provider a magnified view of your cervix. It's done using a lighted microscope called a colposcope. In most cases, a sample of cervical cells is taken during a biopsy. The sample can then be studied in a lab. If any problems are found, you and your provider will discuss treatment choices. It usually takes less than 30 minutes, and you can often go back to your normal routine right away.   Reasons for the procedure  Colposcopy is usually done as a follow-up exam to help find the cause of an abnormal Pap test. Results of an abnormal Pap test can mean that the cells don’t appear normal or that there are cancer cells. Abnormal cells can also be caused by infections. HPV (human papillomavirus) is a large family of viruses that can be passed from person to person through sex. HPV can cause genital warts. It can also cause changes in cervical cells. If an HPV test is positive and the Pap test is abnormal, a colposcopy may be recommended. Colposcopy is also used to evaluate other problems. These include:   Pain or bleeding during sex.  Postmenopausal bleeding.  Unexplained abnormal lower genital tract bleeding.  Sore (lesion) on the vulva or vagina.  Persistent abnormal vaginal discharge.  What are the risks?  Problems after colposcopy are very rare, but can include:   Bleeding (if a biopsy is done).  Infection.  Getting ready for the procedure  Colposcopy is normally done in your health care provider’s office. It will be scheduled for a time when you’re not having your menstrual period. You may be asked to sign a form giving your consent to have the procedure. A day or two before the procedure, your provider may also ask you to:   Not have sex.  Stop using tampons.  Not use creams or other vaginal medicines.  Not clean out the vagina with water or other fluids (douche).  Take over-the-counter pain medicines an hour or two before the procedure.  During  colposcopy    You will be asked to lie on an exam table with your knees bent, just as you do for a Pap test.  A tool called a speculum is inserted into the vagina to hold it open.  A vinegar or iodine solution is applied to the cervix to make the abnormal cells easier to see. You may feel pressure or a slight burning for a few moments. In some cases, the cervix may be numbed first with an anesthetic.  The cervix is looked at through the colposcope. This is placed outside the vagina.  If your health care provider sees abnormal areas on your cervix, a biopsy will be done. You may feel a slight pinch and cramping. The tissue sample is sent to a lab for study.  An endocervical curettage may also be done at the time of colposcopy. In this procedure, a tool is put into the endocervical canal to get a sample of cells from the endocervix. This area can't be seen with a colposcope.   You may feel slight pinching or cramping during the biopsy. Medicine or a thick paste may be applied to the biopsy site to stop bleeding.  After the procedure  If you feel lightheaded or dizzy, you can rest on the table until you’re ready to sit up.  If a biopsy was done, you may have mild cramping or light bleeding for a few days. You may also have a thick, dark discharge from the medicine used to stop bleeding at the biopsy site.  Use pads or panty liners, but not tampons, for at least the first 24 hours.  If you have any discomfort, over-the-counter pain medicine can provide relief.  Ask your health care provider when you can have sex again.    Follow-up  If a biopsy was done, your health care provider will get the lab report in a week or 2. You and your provider can then discuss the results. In some cases, you may be scheduled for more tests or treatment. Be sure to keep follow-up appointments with your provider.   When to contact your doctor  Contact your health care provider if you have:   Heavy vaginal bleeding (more than one pad an hour  for 2 hours).  Severe or increasing pelvic pain.  A fever over 100.4°F (38°C), or higher, or as directed by your provider.  Bad-smelling or unusual vaginal discharge.  Shayla last reviewed this educational content on 2/1/2025  This information is for informational purposes only. This is not intended to be a substitute for professional medical advice, diagnosis, or treatment. Always seek the advice and follow the directions from your physician or other qualified health care provider.  © 8948-0515 [employer]. All rights reserved. This information is not intended as a substitute for professional medical care. Always follow your healthcare professional's instructions.

## 2025-05-21 NOTE — PROCEDURES
Colposcopy Procedure Note    Date of Procedure: 05/21/25    Indications: 34 year old y/o female with ASCUS and HR HPV positive on most recent pap smear    Pre-procedure diagnosis:   ASCUS and HR HPV positive    Post-procedure diagnosis:  ASCUS and HR HPV positive    Procedure:  Colposcopy   Cervical Biopsy   ECC     Procedure Details:   A discussion was held with patient including diagnosis, prognosis and management. Recommendation made for colposcopy with possible biopsies. Risks, benefits and alteratives were discussed with the patient. The patient was agreed to proceed with procedure. She provided written and verbal consent. The patient was positioned supine and in stir-ups.    The sterile speculum was placed in the vagina and the cervix was visualized. The vagina appeared normal with no lesions or discolorations noted. The cervix was then swabbed with sterile saline and no lesions seen under gross examination. Green light filter was negative.      After application of acetic acid, white feathery acetowhite changes were noted at 11 and 4 o'clock. No mocaism, no punctations seen on gross examination. This findings were consistent after the application of Lugol's solution.     A biopsy was then collected at each indicated lesion site.     The transformation zone was fully visualized. No lesions noted. satisfactory Colposcopy. Therefore, ECC was collected.     Biopsy sites were examined. Silver Nitrate and Monsel's solution was applied to the cervix. Good hemostasis noted. All instruments were removed from the vagina.     All tissue were placed into the designated specimen containers and collected to be sent to pathology.     Impression: ISA 1 pending final pathology     Disposition: Stable. RTC in 1 year for well woman exam or sooner if needed.        Gabrielle Norwood DO  [facility] - OBGYN    Discussed with patient that there will not be further notification of normal or benign results other than receiving results on  Xeko. A Xeko message or telephone call will be placed by the physician and/or office staff if results are abnormal.       Note to patient and family   The 21st Century Cures Act makes medical notes available to patients in the interest of transparency.  However, please be advised that this is a medical document.  It is intended as gvtu-hq-pazy communication.  It is written and medical language may contain abbreviations or verbiage that are technical and unfamiliar.  It may appear blunt or direct.  Medical documents are intended to carry relevant information, facts as evident, and the clinical opinion of the practitioner.      This note could include assistance by Dragon voice recognition. Errors in content may be related to improper recognition by the system; efforts to review and correct have been done but errors may still exist.

## 2025-05-28 NOTE — TELEPHONE ENCOUNTER
Spoke to [facility]-  Testosterone lab code needed for females  Female code: 66355     Routed to provider for review-

## 2025-05-30 LAB
ABSOLUTE BASOPHILS: 30 CELLS/UL (ref 0–200)
ABSOLUTE EOSINOPHILS: 90 CELLS/UL (ref 15–500)
ABSOLUTE LYMPHOCYTES: 1490 CELLS/UL (ref 850–3900)
ABSOLUTE MONOCYTES: 335 CELLS/UL (ref 200–950)
ABSOLUTE NEUTROPHILS: 3055 CELLS/UL (ref 1500–7800)
ALBUMIN/GLOBULIN RATIO: 1.7 (CALC) (ref 1–2.5)
ALBUMIN: 4.5 G/DL (ref 3.6–5.1)
ALKALINE PHOSPHATASE: 60 U/L (ref 31–125)
ALT: 8 U/L (ref 6–29)
AST: 14 U/L (ref 10–30)
BASOPHILS: 0.6 %
BILIRUBIN, TOTAL: 0.5 MG/DL (ref 0.2–1.2)
BUN: 12 MG/DL (ref 7–25)
CALCIUM: 9.8 MG/DL (ref 8.6–10.2)
CARBON DIOXIDE: 27 MMOL/L (ref 20–32)
CHLORIDE: 106 MMOL/L (ref 98–110)
CHOL/HDLC RATIO: 2.6 (CALC)
CHOLESTEROL, TOTAL: 166 MG/DL
CORTISOL, TOTAL: 5.9 MCG/DL
CREATININE: 0.76 MG/DL (ref 0.5–0.97)
DHEA SULFATE: 335 MCG/DL (ref 19–237)
EGFR: 106 ML/MIN/1.73M2
EOSINOPHILS: 1.8 %
GLOBULIN: 2.7 G/DL (CALC) (ref 1.9–3.7)
GLUCOSE: 84 MG/DL (ref 65–99)
HDL CHOLESTEROL: 63 MG/DL
HEMATOCRIT: 41.5 % (ref 35–45)
HEMOGLOBIN: 13.7 G/DL (ref 11.7–15.5)
LDL-CHOLESTEROL: 90 MG/DL (CALC)
LYMPHOCYTES: 29.8 %
MCH: 31.6 PG (ref 27–33)
MCHC: 33 G/DL (ref 32–36)
MCV: 95.6 FL (ref 80–100)
MONOCYTES: 6.7 %
MPV: 10.3 FL (ref 7.5–12.5)
NEUTROPHILS: 61.1 %
NON-HDL CHOLESTEROL: 103 MG/DL (CALC)
PLATELET COUNT: 266 THOUSAND/UL (ref 140–400)
POTASSIUM: 4.4 MMOL/L (ref 3.5–5.3)
PROTEIN, TOTAL: 7.2 G/DL (ref 6.1–8.1)
RDW: 11.8 % (ref 11–15)
RED BLOOD CELL COUNT: 4.34 MILLION/UL (ref 3.8–5.1)
SODIUM: 139 MMOL/L (ref 135–146)
TRIGLYCERIDES: 51 MG/DL
WHITE BLOOD CELL COUNT: 5 THOUSAND/UL (ref 3.8–10.8)

## 2025-06-02 ENCOUNTER — TELEPHONE (OUTPATIENT)
Dept: OBGYN CLINIC | Facility: CLINIC | Age: 35
End: 2025-06-02

## 2025-06-02 RX ORDER — DROSPIRENONE AND ETHINYL ESTRADIOL 0.02-3(28)
1 KIT ORAL DAILY
Qty: 84 TABLET | Refills: 0 | Status: SHIPPED | OUTPATIENT
Start: 2025-06-02 | End: 2026-06-02

## 2025-06-02 NOTE — TELEPHONE ENCOUNTER
Rx sent for OCP- 3 months only. She is overdue for an annual exam so she needs to schedule one for follow up in 3 months. Advised on risk and danger signs for VTE, start with menses.

## 2025-06-02 NOTE — TELEPHONE ENCOUNTER
Only if DHEA- S is above 800 do we manage an isolate elevated DHEA with additional testing (looking for an adrenal tumor). It is only slightly elevated. She can certainly endocrinology with any continued concerns. I did send a message about the Testosterone lab ANABEL.

## 2025-06-02 NOTE — TELEPHONE ENCOUNTER
Patient notified of instructions per NGUYỄN Laboy.  ER precautions reviewed for any potential complications related to blood clots.  Patient verbalized understanding and will schedule her annual exam through MyChart or by calling .

## 2025-06-02 NOTE — TELEPHONE ENCOUNTER
Routed to NGUYỄN Laboy- please advise.  Per chart review- DHEA and Cortisol were ordered per dermatologist request and it was communicated that the results/follow-up would need to be managed by the dermatologist.

## 2025-06-02 NOTE — TELEPHONE ENCOUNTER
Patent states that she is seeing a dermatologist who recommended some tests be completed.  The tests were ordered by our office and have been completed.  The patient states that results from the tests were abnormal and her dermatologist is indicating that the results could be hormonal and advised to follow up with our office.   Please advise patient via phone number on file or via My Chart.  Thank you.

## 2025-06-02 NOTE — TELEPHONE ENCOUNTER
Spoke to patient.  Patient states that her dermatologist suggested that she may need estrogen to help with her hair loss and possible PMDD.  Patient has noticed some depression and fatigue about 1 week prior to her menses since being off of the pill and her cycle is starting to become irregular.  Patient would like to restart the BONI to see if that improves her hair loss, regulates her cycle and decreases the PMDD symptoms.  Patient declines need for testosterone lab at this time.    Routed to Yecenia Abarca-NGUYỄN Florence- please advise on refills for BONI.  OCP start methods reviewed.  Patient aware that it may take up to 3 months for her body to adjust to the new hormone level.  Patient verbalized understanding.

## 2025-06-23 DIAGNOSIS — E66.3 OVERWEIGHT (BMI 25.0-29.9): ICD-10-CM

## 2025-06-23 RX ORDER — PHENTERMINE HYDROCHLORIDE 30 MG/1
30 CAPSULE ORAL EVERY MORNING
Qty: 30 CAPSULE | Refills: 0 | Status: SHIPPED | OUTPATIENT
Start: 2025-06-23

## 2025-06-23 NOTE — TELEPHONE ENCOUNTER
PA request submitted via [employer], awaiting a response.      Phentermine HCl 30 MG Oral Cap     Payer Waiting for Response   6/23/2025 10:52 AM  Deadline to reply: June 29, 2025  4:52 AM User: Arlen Belcher MA   Note from payer: Please answer the provided questions and return this form when complete. (Message [phone])   Payer: [employer] [facility] Case ID: [id number]    [phone]    [phone]  Antiobesity Agents [facility] STD 10/2024 PA **BRANDI WEB** ePA Review (DOC)   Antiobesity Agents [facility] STD 10/2024 PA **BRANDI WEB** ePA Review (DOC)  Question Answer    Has the patient received 3 months of therapy with the requested drug within the past 365 days? NO    Will the requested drug be used with a reduced-calorie diet AND increased physical activity in the management of exogenous obesity? YES    Has the patient participated in a comprehensive weight management program that encourages behavioral modification, reduced-calorie diet, AND increased physical activity with continuing follow-up for at least 6 months prior to using drug therapy? YES    Does the patient have a baseline body mass index (BMI) of less than 27 kg/m2? [NOTE: If the patient is transitioning from another drug therapy for weight loss, please consider their baseline BMI at the start of any drug therapy when answering this question.] NO    Does the patient have a baseline body mass index (BMI) of 27 kg/m2 to less than 30 kg/m2? ACTION REQUIRED: If yes, then prescriber MUST submit chart notes that show the patient's baseline BMI. [NOTE: If the patient is transitioning from another drug therapy for weight loss, please consider their baseline BMI at the start of any drug therapy when answering this question.] YES (patient's BMI is 28.79 kg/m2)    Have chart notes showing the patient's baseline body mass index (BMI) been submitted to [facility]? ACTION REQUIRED: Submit supporting documentation. [NOTE: If the patient is transitioning from another drug therapy for  weight loss, please provide their baseline BMI at the start of any drug therapy.] YES (See attached chart notes.)    Does the patient have at least ONE weight-related comorbid condition (e.g., hypertension, type 2 diabetes mellitus, dyslipidemia)? ACTION REQUIRED: If yes, then prescriber MUST submit chart notes that indicate the patient's weight-related comorbid condition(s).  [NOTE: If the patient is transitioning from another drug therapy for weight loss, please consider their weight-related comorbid condition(s) at the start of any drug therapy when answering this question.] NO (No)

## 2025-06-23 NOTE — TELEPHONE ENCOUNTER
Requesting Phentermine 30mg  Last OV: 5/8/25 Physical  RTC: 1 year  Last Rx'd 5/8/25 #30 with 0 refills    Future Appointments   Date Time Provider Department Center   7/9/2025 12:00 PM Gabrielle Norwood,  [facility]   1/8/2026  1:00 PM Melissa Gutiérrez APRN [facility]       Per [place] , last dispensed 5/8/25 #30    Controlled med:  Rx pended and routed for approval/denial

## 2025-07-09 ENCOUNTER — OFFICE VISIT (OUTPATIENT)
Dept: OBGYN CLINIC | Facility: CLINIC | Age: 35
End: 2025-07-09
Payer: COMMERCIAL

## 2025-07-09 VITALS
WEIGHT: 168 LBS | HEIGHT: 65 IN | DIASTOLIC BLOOD PRESSURE: 78 MMHG | BODY MASS INDEX: 27.99 KG/M2 | HEART RATE: 61 BPM | SYSTOLIC BLOOD PRESSURE: 118 MMHG

## 2025-07-09 DIAGNOSIS — R87.810 CERVICAL HIGH RISK HUMAN PAPILLOMAVIRUS (HPV) DNA TEST POSITIVE: ICD-10-CM

## 2025-07-09 DIAGNOSIS — Z01.818 PREPROCEDURAL EXAMINATION: Primary | ICD-10-CM

## 2025-07-09 DIAGNOSIS — N87.1 MODERATE DYSPLASIA OF CERVIX: ICD-10-CM

## 2025-07-09 PROCEDURE — 81025 URINE PREGNANCY TEST: CPT | Performed by: STUDENT IN AN ORGANIZED HEALTH CARE EDUCATION/TRAINING PROGRAM

## 2025-07-09 PROCEDURE — 88307 TISSUE EXAM BY PATHOLOGIST: CPT | Performed by: STUDENT IN AN ORGANIZED HEALTH CARE EDUCATION/TRAINING PROGRAM

## 2025-07-09 PROCEDURE — 88305 TISSUE EXAM BY PATHOLOGIST: CPT | Performed by: STUDENT IN AN ORGANIZED HEALTH CARE EDUCATION/TRAINING PROGRAM

## 2025-07-09 PROCEDURE — 57461 CONZ OF CERVIX W/SCOPE LEEP: CPT | Performed by: STUDENT IN AN ORGANIZED HEALTH CARE EDUCATION/TRAINING PROGRAM

## 2025-07-09 RX ORDER — LIDOCAINE HYDROCHLORIDE 10 MG/ML
1 INJECTION, SOLUTION INFILTRATION; PERINEURAL ONCE
Status: SHIPPED | OUTPATIENT
Start: 2025-07-09

## 2025-07-09 RX ORDER — MINOXIDIL 2.5 MG/1
TABLET ORAL
COMMUNITY
Start: 2025-06-19

## 2025-07-09 NOTE — PATIENT INSTRUCTIONS
LEEP  LEEP stands for loop electrosurgical excision procedure. It's used to treat abnormal cell growth (dysplasia). A fine wire loop is used to remove a small amount of tissue from your cervix. This can be done in the healthcare provider’s office. You can go back to your routine the same day. Schedule your LEEP for a time when you are not menstruating.   During the procedure  You’ll place your feet in stirrups. Your healthcare provider then inserts a speculum into your vagina. The speculum holds the walls of the vagina open to let the healthcare provider see the cervix:   Your cervix is numbed with a local anesthetic.  A mild vinegar or iodine solution may be applied to your cervix. This helps to highlight any dysplasia.  Your healthcare provider may look through a colposcope. This helps to get a close-up view of your cervix.  The loop is inserted through your vagina and moved toward the cervix.  The loop is used to remove a small piece of cervical tissue.  A medicated solution may be applied to the cervix. This helps reduce bleeding.    After the procedure  You may have a watery pink discharge and mild cramping following the procedure. Also, the solution used to decrease bleeding may cause dark vaginal discharge for a few days. Don't place anything in your vagina or have sex until your healthcare provider tells you it's OK. Your cervix should heal completely within a few weeks.   When to call your healthcare provider  Call your healthcare provider right away if you have any of the following:   Heavy bleeding or bleeding with clots  Severe belly pain  Fever  [employer] last reviewed this educational content on 7/1/2022  This information is for informational purposes only. This is not intended to be a substitute for professional medical advice, diagnosis, or treatment. Always seek the advice and follow the directions from your physician or other qualified health care provider.  © 7044-7466 [employer].  All rights reserved. This information is not intended as a substitute for professional medical care. Always follow your healthcare professional's instructions.

## 2025-07-09 NOTE — PROCEDURES
LEEP Conization Procedure Note    Post-operative Diagnosis and Indication: ISA 2    Procedure Details:   Urine pregnancy test negative.  The risks including infection, bleeding with need for additional procedures, scarring, and incompetent cervix with risks during future pregnancy including  labor/delivery were explained to the patient and verbal informed consent obtained.    Colposcopy performed using dilute acetic acid solution.  Colposcopic findings: 4 o'clock acetowhite changes.  Paracervical block performed using 10 ml 1.5% xylocaine with epinephrine injected into cervix after mucosal anesthetic applied to cervix for patient comfort.    Using 48 stone of blended current, LEEP conization of the cervix performed using a   20X12 mm and 10X10 mm loop.  Endocervical curettage performed.  Ball tip cautery and Monsel's solution applied for hemostasis.    Condition:  Stable    Complications:  None    Plan:  Reviewed signs and symptoms of post-procedure complications.    Follow up-  Will call patient with biopsy results and recommendations regarding follow up.      DO KATIE Mathias - OBGYN  2025 1:00 PM

## 2025-08-25 RX ORDER — DROSPIRENONE AND ETHINYL ESTRADIOL 0.02-3(28)
1 KIT ORAL DAILY
Qty: 84 TABLET | Refills: 0 | Status: SHIPPED | OUTPATIENT
Start: 2025-08-25 | End: 2026-08-25

## (undated) DIAGNOSIS — F32.1 CURRENT MODERATE EPISODE OF MAJOR DEPRESSIVE DISORDER WITHOUT PRIOR EPISODE (HCC): ICD-10-CM

## (undated) DIAGNOSIS — Z30.41 SURVEILLANCE FOR BIRTH CONTROL, ORAL CONTRACEPTIVES: ICD-10-CM

## (undated) NOTE — MR AVS SNAPSHOT
After Visit Summary   10/26/2020    Inocencio Began    MRN: [id number]           Visit Information     Date & Time  10/26/2020  4:00 PM Provider  Ewelina Alcantar [facility] [address] [address] [place] [facility]  Phone  [phone] If you receive a survey from [employer] please take a few minutes to complete it and provide feedback. We strive to deliver the best patient experience and are looking for ways to make improvements. Your feedback will help us do so.  For more information EMERGENCY ROOM Life-threatening emergencies needing immediate intervention at a hospital emergency room.  Average cost  $2,300*   *Cost varies based on your insurance coverage  For more information about hours, locations or appointment options available at

## (undated) NOTE — LETTER
10/19/17    To whom this may concern,    Wandy Clark is a current patient in our practice. She was seen in the office on 10/18/17. If you have any questions or concerns please let us know.     Sincerely,              NGUYỄN Reyna

## (undated) NOTE — LETTER
[facility] IN [place]  [address] [address]  Dept: [phone]  Dept Fax: [phone]      January 1, 2017    Patient: Rob Carrillo   Date of Visit: 1/1/2017       To Whom It May Concern:    Rob Carrillo was seen and treate

## (undated) NOTE — LETTER
Cristina Perez, :1990    CONSENT FOR PROCEDURE/SEDATION    1. I authorize the performance upon Cristina Perez  the following: Colposcopy with biopsy and Endocervical curettage    2. I authorize Dr. Gabrielle Norwood, DO (and whomever is designated as the doctor’s assistant), to perform the above-mentioned procedures.    3. If any unforeseen conditions arise during this procedure calling for additional  procedures, operations, or medications (including anesthesia and blood transfusion), I further request and authorize the doctor to do whatever he/she deems advisable in my interest.    4. I consent to the taking and reproduction of any photographs in the course of this procedure for professional purposes.    5. I consent to the administration of such sedation as may be considered necessary or advisable by the physician responsible for this service, with the exception of ______________________________________________________    6. I have been informed by my doctor of the nature and purpose of this procedure sedation, possible alternative methods of treatment, risk involved and possible complications.    7. If I have a Do Not Resuscitate (DNR) order in place, the physician and I (or the individual authorized to consent on my behalf) will discuss and agree as to whether the Do Not Resuscitate (DNR) order will remain in effect or will be discontinued during the performance of the procedure and the applicable recovery period. If the Do Not Resuscitate (DNR) order is discontinued and is to be reinstated following the procedure/recovery period, the physician will determine when the applicable recovery period ends for purposes of reinstating the Do Not Resuscitate (DNR) order.    Signature of Patient:_______________________________________________    Signature of person authorized to consent for patient:  _______________________________________________________________    Relationship to patient:  ____________________________________________    Witness: _________________________________________ Date:___________     Physician Signature: _______________________________ Date:___________

## (undated) NOTE — LETTER
Cristina Perez, :1990    CONSENT FOR PROCEDURE/SEDATION    1. I authorize the performance upon Cristina Perez  the following: Loop Electrosurgical Excision Procedure/Excision    2. I authorize Dr. Gabrielle Norwood, DO (and whomever is designated as the doctor’s assistant), to perform the above-mentioned procedures.    3. If any unforeseen conditions arise during this procedure calling for additional  procedures, operations, or medications (including anesthesia and blood transfusion), I further request and authorize the doctor to do whatever he/she deems advisable in my interest.    4. I consent to the taking and reproduction of any photographs in the course of this procedure for professional purposes.    5. I consent to the administration of such sedation as may be considered necessary or advisable by the physician responsible for this service, with the exception of ______________________________________________________    6. I have been informed by my doctor of the nature and purpose of this procedure sedation, possible alternative methods of treatment, risk involved and possible complications.    7. If I have a Do Not Resuscitate (DNR) order in place, the physician and I (or the individual authorized to consent on my behalf) will discuss and agree as to whether the Do Not Resuscitate (DNR) order will remain in effect or will be discontinued during the performance of the procedure and the applicable recovery period. If the Do Not Resuscitate (DNR) order is discontinued and is to be reinstated following the procedure/recovery period, the physician will determine when the applicable recovery period ends for purposes of reinstating the Do Not Resuscitate (DNR) order.    Signature of Patient:_______________________________________________    Signature of person authorized to consent for patient:  _______________________________________________________________    Relationship to patient:  ____________________________________________    Witness: _________________________________________ Date:___________     Physician Signature: _______________________________ Date:___________

## (undated) NOTE — ED AVS SNAPSHOT
[facility] in [address]    [address] [place] [address]    Phone:  [phone]    Fax:  [phone]           Juli Montano   MRN: [id number]    Department:  THE [facility] in [address] [place] [address],[address]   Date of Visit:  1/1/2017 You can get these medications from any pharmacy     Bring a paper prescription for each of these medications    - Cetirizine-Pseudoephedrine ER 5-120 MG Tb12              Discharge Instructions       Return to the emergency department if worse or any josef a substitute for ongoing medical care. Often, one Immediate Care visit does not uncover every injury or illness.  If you have been referred to a primary care or a specialist physician for a follow-up visit, please tell this physician (or your personal docto Azeem Mccarthy [address]) [phone]                Additional Information       We are concerned for your overall well being:    - If you are a smoker or have smoked in the last 12 months, we encourage you to explore options for [facility] Enter your [employer] Activation Code exactly as it appears below along with your Zip Code and Date of Birth to complete the sign-up process. If you do not sign up before the expiration date, you must request a new code.     Your unique Mus[employer] Access Code: [address]